# Patient Record
Sex: FEMALE | Race: WHITE | NOT HISPANIC OR LATINO | Employment: OTHER | URBAN - NONMETROPOLITAN AREA
[De-identification: names, ages, dates, MRNs, and addresses within clinical notes are randomized per-mention and may not be internally consistent; named-entity substitution may affect disease eponyms.]

---

## 2021-05-17 ENCOUNTER — OFFICE VISIT (OUTPATIENT)
Dept: CARDIOLOGY | Facility: CLINIC | Age: 51
End: 2021-05-17

## 2021-05-17 VITALS
HEART RATE: 88 BPM | DIASTOLIC BLOOD PRESSURE: 88 MMHG | TEMPERATURE: 97.8 F | HEIGHT: 68 IN | BODY MASS INDEX: 39.07 KG/M2 | WEIGHT: 257.8 LBS | SYSTOLIC BLOOD PRESSURE: 156 MMHG

## 2021-05-17 DIAGNOSIS — R06.02 SHORTNESS OF BREATH: ICD-10-CM

## 2021-05-17 DIAGNOSIS — I25.10 CORONARY ARTERY CALCIFICATION SEEN ON CAT SCAN: ICD-10-CM

## 2021-05-17 DIAGNOSIS — E88.81 METABOLIC SYNDROME: ICD-10-CM

## 2021-05-17 DIAGNOSIS — R53.82 CHRONIC FATIGUE: ICD-10-CM

## 2021-05-17 DIAGNOSIS — I10 ESSENTIAL HYPERTENSION: ICD-10-CM

## 2021-05-17 DIAGNOSIS — E78.00 HYPERCHOLESTEREMIA: ICD-10-CM

## 2021-05-17 DIAGNOSIS — R07.89 CHEST HEAVINESS: Primary | ICD-10-CM

## 2021-05-17 DIAGNOSIS — Z72.0 TOBACCO USE: ICD-10-CM

## 2021-05-17 DIAGNOSIS — K44.9 HIATAL HERNIA: ICD-10-CM

## 2021-05-17 PROBLEM — E88.810 METABOLIC SYNDROME: Status: ACTIVE | Noted: 2021-05-17

## 2021-05-17 PROCEDURE — 93000 ELECTROCARDIOGRAM COMPLETE: CPT | Performed by: INTERNAL MEDICINE

## 2021-05-17 PROCEDURE — 99204 OFFICE O/P NEW MOD 45 MIN: CPT | Performed by: INTERNAL MEDICINE

## 2021-05-17 RX ORDER — ASPIRIN 81 MG/1
81 TABLET ORAL DAILY
Qty: 30 TABLET | Refills: 6 | Status: SHIPPED | OUTPATIENT
Start: 2021-05-17 | End: 2022-10-26 | Stop reason: SDUPTHER

## 2021-05-17 RX ORDER — CALCIUM CARBONATE 200(500)MG
1 TABLET,CHEWABLE ORAL 3 TIMES DAILY PRN
COMMUNITY

## 2021-05-17 RX ORDER — ROSUVASTATIN CALCIUM 10 MG/1
10 TABLET, COATED ORAL DAILY
Qty: 30 TABLET | Refills: 11 | Status: SHIPPED | OUTPATIENT
Start: 2021-05-17 | End: 2022-10-26

## 2021-05-17 RX ORDER — LISINOPRIL AND HYDROCHLOROTHIAZIDE 20; 12.5 MG/1; MG/1
1 TABLET ORAL DAILY
COMMUNITY

## 2021-05-17 NOTE — PROGRESS NOTES
"Chief Complaint   Patient presents with   • Establish Care     Referred per PCP for CAD  . She is now following with [facility] and with  in June 2021   • Chest Pain     Describes chest \"heaviness \" and \"flutters\" . Says these episodes happen frequently.   • Shortness of Breath     reports SOA with minimal exertion  . She does have hiatal hernia and Acid reflux .   • Hypertension     Reports she has not taken antihypertensive this morning.   • LABS     Labs on chart and in referral packet        CARDIAC COMPLAINTS  chest pressure/discomfort, dyspnea and fatigue      Subjective   Monet Espinal is a 50 y.o. female came in today for her initial cardiac evaluation.  She has history of hypertension who has been having problems in the form of chest discomfort, shortness of breath and fatigue.  She also has history of kidney problem and is being followed by nephrologist at [facility].  She also has history of hiatal hernia for which she has seen the gastroenterologist and now seeing a general surgeon also.  She underwent a CT scan of the chest and abdomen.  Found to have a very large hiatal hernia containing the entire gastric fundus and majority of the gastric body.  It was also noted to have significant coronary arterial calcification and also renal vascular calcification.  She also noted to have some changes in the kidney and as stated is followed by the nephrologist at [facility].  She is now referred because she has been having symptoms of chest pain and shortness of breath.  The chest pain is pressure-like feeling in the left part of the chest.  It is different from the heartburn she gets from the hiatal hernia.  It occurs mostly on exertion and occasionally at rest.  She also has been noticing increasing shortness of breath which started occurring even on minimal exertion recently.  She also complain of having extreme fatigue and tired not able to do most of her activities.  She did undergo some " lab work and found to have mild anemia, low iron and low iron saturation.  She also had a low MCV.  Her vitamin D was 25.  Her cholesterol was 207 with the LDL of 135.  Her renal function and liver function appears to be within normal limit.  Her TSH was normal.  She unfortunately smokes about a half a pack a day.  She did quit in January for few months and then restarted again.  She is trying to use vaping.  She drinks occasional alcohol.  Her father  with heart disease.    History reviewed. No pertinent surgical history.    Current Outpatient Medications   Medication Sig Dispense Refill   • calcium carbonate (TUMS) 500 MG chewable tablet Chew 1 tablet 3 (Three) Times a Day As Needed for Indigestion or Heartburn.     • Cyanocobalamin (VITAMIN B-12 IJ) Inject 1 mL as directed Every 30 (Thirty) Days.     • lisinopril-hydrochlorothiazide (PRINZIDE,ZESTORETIC) 20-12.5 MG per tablet Take 1 tablet by mouth Daily.     • aspirin (aspirin) 81 MG EC tablet Take 1 tablet by mouth Daily. 30 tablet 6   • nicotine (NICOTROL) 10 MG inhaler Inhale 1 puff As Needed for Smoking Cessation. 168 each 6   • rosuvastatin (CRESTOR) 10 MG tablet Take 1 tablet by mouth Daily. 30 tablet 11     No current facility-administered medications for this visit.           ALLERGIES:  Patient has no known allergies.    Past Medical History:   Diagnosis Date   • Anemia    • CAD (coronary artery disease)    • GERD (gastroesophageal reflux disease)    • Hiatal hernia    • Hyperlipidemia    • Hypertension    • Renal cyst    • Sleep apnea    • Vitamin D deficiency        Social History     Tobacco Use   Smoking Status Current Every Day Smoker   • Packs/day: 0.50   • Years: 35.00   • Pack years: 17.50   Smokeless Tobacco Never Used          Family History   Problem Relation Age of Onset   • Breast cancer Mother    • Heart disease Father    • Hypertension Father    • Heart attack Father    • Hypertension Brother    • Cancer Maternal Grandmother    • No  "Known Problems Maternal Grandfather    • Diabetes Paternal Grandmother    • Heart attack Paternal Grandmother    • No Known Problems Paternal Grandfather        Review of Systems   Constitutional: Positive for malaise/fatigue. Negative for decreased appetite.   HENT: Negative for congestion and sore throat.    Eyes: Negative for blurred vision.   Cardiovascular: Positive for chest pain and dyspnea on exertion.   Respiratory: Positive for shortness of breath and snoring.    Endocrine: Negative for cold intolerance and heat intolerance.   Hematologic/Lymphatic: Negative for adenopathy. Does not bruise/bleed easily.   Skin: Negative for itching, nail changes and skin cancer.   Musculoskeletal: Negative for arthritis and myalgias.   Gastrointestinal: Negative for abdominal pain, dysphagia and heartburn.   Genitourinary: Negative for bladder incontinence and frequency.   Neurological: Negative for dizziness, light-headedness, seizures and vertigo.   Psychiatric/Behavioral: Negative for altered mental status.   Allergic/Immunologic: Negative for environmental allergies and hives.       Diabetes- No  Thyroid- normal    Objective     /88 (BP Location: Right arm)   Pulse 88   Temp 97.8 °F (36.6 °C)   Ht 172.7 cm (68\")   Wt 117 kg (257 lb 12.8 oz)   BMI 39.20 kg/m²     Vitals and nursing note reviewed.   Constitutional:       Appearance: Healthy appearance. Not in distress.   Eyes:      Conjunctiva/sclera: Conjunctivae normal.      Pupils: Pupils are equal, round, and reactive to light.   HENT:      Head: Normocephalic.   Pulmonary:      Effort: Pulmonary effort is normal.      Breath sounds: Normal breath sounds.   Cardiovascular:      PMI at left midclavicular line. Normal rate. Regular rhythm.      Murmurs: There is a systolic murmur.   Abdominal:      General: Bowel sounds are normal.      Palpations: Abdomen is soft.   Musculoskeletal: Normal range of motion.      Cervical back: Normal range of motion and neck " supple. Skin:     General: Skin is warm and dry.   Neurological:      Mental Status: Alert, oriented to person, place, and time and oriented to person, place and time.           ECG 12 Lead    Date/Time: 5/17/2021 12:49 PM  Performed by: Lupillo Nelson MD  Authorized by: Lupillo Nelson MD   Previous ECG: no previous ECG available  Rhythm: sinus rhythm  Rate: normal  QRS axis: normal  Other findings: non-specific ST-T wave changes    Clinical impression: non-specific ECG              Assessment/Plan   Patient's Body mass index is 39.2 kg/m². indicating that she is obese (BMI >30). Obesity-related health conditions include the following: hypertension, coronary heart disease and dyslipidemias. Obesity is newly identified. BMI is is above average; BMI management plan is completed. We discussed low calorie, low carb based diet program, portion control and increasing exercise..     Diagnoses and all orders for this visit:    1. Chest heaviness (Primary)  -     Stress Test With Myocardial Perfusion One Day; Future  -     aspirin (aspirin) 81 MG EC tablet; Take 1 tablet by mouth Daily.  Dispense: 30 tablet; Refill: 6    2. Essential hypertension    3. Hypercholesteremia  -     Stress Test With Myocardial Perfusion One Day; Future  -     rosuvastatin (CRESTOR) 10 MG tablet; Take 1 tablet by mouth Daily.  Dispense: 30 tablet; Refill: 11    4. Metabolic syndrome    5. Tobacco use  -     nicotine (NICOTROL) 10 MG inhaler; Inhale 1 puff As Needed for Smoking Cessation.  Dispense: 168 each; Refill: 6    6. Hiatal hernia    7. Shortness of breath  -     Adult Transthoracic Echo Complete W/ Cont if Necessary Per Protocol; Future    8. Coronary artery calcification seen on CAT scan  -     aspirin (aspirin) 81 MG EC tablet; Take 1 tablet by mouth Daily.  Dispense: 30 tablet; Refill: 6    9. Chronic fatigue  -     Overnight Sleep Oximetry Study; Future    At baseline her heart rate is stable.  Her blood pressure is  elevated.  She has not taken her medications today.  Her EKG shows normal sinus rhythm with nonspecific ST changes mostly in the inferior leads.  Her clinical examination reveals a BMI of 39.  She does short systolic murmur at the mitral area, slightly diminished peripheral pulse and no pedal edema.    The chest heaviness she describes is worrisome especially with hypertension, coronary calcification by CT scan and hypercholesterolemia along with the smoking.  I scheduled her to undergo a stress test.  Since she is not able to walk much secondary to tendinitis as well as other foot problem, it will be done as a Lexiscan.  I also advised her to be on aspirin at 81 mg once a day.    Regarding her hypertension, it is slightly elevated but she has not taken the medications.  I advised her to start taking it regularly.  If it is still elevated then will consider adding a calcium channel blocker    Regarding her hypercholesterolemia, given her multiple risk factors it needs to be addressed more aggressively.  I started her on Crestor 10 mg once a day and have it rechecked in about 3 to 4 months.  Like to keep the LDL less than 70 if possible    Regarding her metabolic syndrome, had a long talk with her about diet exercise and weight reduction.  I gave her papers on Mediterranean diet    Regarding her tobacco use, I explained to her that using vaping will not be helpful.  I did talk to her about the increased risk of developing more pulmonary problem.  Also talked to her about malignancy, vascular, pulmonary and cardiac problem.  I talked to her about using Nicotrol inhalers and prescription was given.  I also gave her papers to help her quit smoking    Regarding the hiatal hernia, she has been followed by the gastroenterologist.      Regarding the shortness of breath, it could be secondary to her COPD but need to rule out cardiac cause.  I advised her to undergo an echocardiogram to evaluate the LV function, valvular  structures as well as the PA pressure.    Regarding her chronic fatigue, it could be secondary to sleep apnea, advised her to have the nocturnal pulse PO2 measurement done    Based on the results, further recommendations will be made.        Electronically signed by Lupillo Nelson MD May 17, 2021 12:34 EDT

## 2021-05-17 NOTE — PATIENT INSTRUCTIONS
For more information:    Quit Now [place]  1-800-QUIT-NOW  https://[place].quitlogix.org/en-[place]/  Steps to Quit Smoking  Smoking tobacco can be harmful to your health and can affect almost every organ in your body. Smoking puts you, and those around you, at risk for developing many serious chronic diseases. Quitting smoking is difficult, but it is one of the best things that you can do for your health. It is never too late to quit.  What are the benefits of quitting smoking?  When you quit smoking, you lower your risk of developing serious diseases and conditions, such as:  · Lung cancer or lung disease, such as COPD.  · Heart disease.  · Stroke.  · Heart attack.  · Infertility.  · Osteoporosis and bone fractures.  Additionally, symptoms such as coughing, wheezing, and shortness of breath may get better when you quit. You may also find that you get sick less often because your body is stronger at fighting off colds and infections. If you are pregnant, quitting smoking can help to reduce your chances of having a baby of low birth weight.  How do I get ready to quit?  When you decide to quit smoking, create a plan to make sure that you are successful. Before you quit:  · Pick a date to quit. Set a date within the next two weeks to give you time to prepare.  · Write down the reasons why you are quitting. Keep this list in places where you will see it often, such as on your bathroom mirror or in your car or wallet.  · Identify the people, places, things, and activities that make you want to smoke (triggers) and avoid them. Make sure to take these actions:  ¨ Throw away all cigarettes at home, at work, and in your car.  ¨ Throw away smoking accessories, such as ashtrays and lighters.  ¨ Clean your car and make sure to empty the ashtray.  ¨ Clean your home, including curtains and carpets.  · Tell your family, friends, and coworkers that you are quitting. Support from your loved ones can make quitting easier.  · Talk with  your health care provider about your options for quitting smoking.  · Find out what treatment options are covered by your health insurance.  What strategies can I use to quit smoking?  Talk with your healthcare provider about different strategies to quit smoking. Some strategies include:  · Quitting smoking altogether instead of gradually lessening how much you smoke over a period of time. Research shows that quitting “cold turkey” is more successful than gradually quitting.  · Attending in-person counseling to help you build problem-solving skills. You are more likely to have success in quitting if you attend several counseling sessions. Even short sessions of 10 minutes can be effective.  · Finding resources and support systems that can help you to quit smoking and remain smoke-free after you quit. These resources are most helpful when you use them often. They can include:  ¨ Online chats with a counselor.  ¨ Telephone quitlines.  ¨ Printed self-help materials.  ¨ Support groups or group counseling.  ¨ Text messaging programs.  ¨ Mobile phone applications.  · Taking medicines to help you quit smoking. (If you are pregnant or breastfeeding, talk with your health care provider first.) Some medicines contain nicotine and some do not. Both types of medicines help with cravings, but the medicines that include nicotine help to relieve withdrawal symptoms. Your health care provider may recommend:  ¨ Nicotine patches, gum, or lozenges.  ¨ Nicotine inhalers or sprays.  ¨ Non-nicotine medicine that is taken by mouth.  Talk with your health care provider about combining strategies, such as taking medicines while you are also receiving in-person counseling. Using these two strategies together makes you more likely to succeed in quitting than if you used either strategy on its own.  If you are pregnant or breastfeeding, talk with your health care provider about finding counseling or other support strategies to quit smoking. Do  not take medicine to help you quit smoking unless told to do so by your health care provider.  What things can I do to make it easier to quit?  Quitting smoking might feel overwhelming at first, but there is a lot that you can do to make it easier. Take these important actions:  · Reach out to your family and friends and ask that they support and encourage you during this time. Call telephone quitlines, reach out to support groups, or work with a counselor for support.  · Ask people who smoke to avoid smoking around you.  · Avoid places that trigger you to smoke, such as bars, parties, or smoke-break areas at work.  · Spend time around people who do not smoke.  · Lessen stress in your life, because stress can be a smoking trigger for some people. To lessen stress, try:  ¨ Exercising regularly.  ¨ Deep-breathing exercises.  ¨ Yoga.  ¨ Meditating.  ¨ Performing a body scan. This involves closing your eyes, scanning your body from head to toe, and noticing which parts of your body are particularly tense. Purposefully relax the muscles in those areas.  · Download or purchase mobile phone or tablet apps (applications) that can help you stick to your quit plan by providing reminders, tips, and encouragement. There are many free apps, such as QuitGuide from the CDC (Centers for Disease Control and Prevention). You can find other support for quitting smoking (smoking cessation) through smokefree.gov and other websites.  How will I feel when I quit smoking?  Within the first 24 hours of quitting smoking, you may start to feel some withdrawal symptoms. These symptoms are usually most noticeable 2-3 days after quitting, but they usually do not last beyond 2-3 weeks. Changes or symptoms that you might experience include:  · Mood swings.  · Restlessness, anxiety, or irritation.  · Difficulty concentrating.  · Dizziness.  · Strong cravings for sugary foods in addition to nicotine.  · Mild weight  gain.  · Constipation.  · Nausea.  · Coughing or a sore throat.  · Changes in how your medicines work in your body.  · A depressed mood.  · Difficulty sleeping (insomnia).  After the first 2-3 weeks of quitting, you may start to notice more positive results, such as:  · Improved sense of smell and taste.  · Decreased coughing and sore throat.  · Slower heart rate.  · Lower blood pressure.  · Clearer skin.  · The ability to breathe more easily.  · Fewer sick days.  Quitting smoking is very challenging for most people. Do not get discouraged if you are not successful the first time. Some people need to make many attempts to quit before they achieve long-term success. Do your best to stick to your quit plan, and talk with your health care provider if you have any questions or concerns.  This information is not intended to replace advice given to you by your health care provider. Make sure you discuss any questions you have with your health care provider.  Document Released: 12/12/2002 Document Revised: 08/15/2017 Document Reviewed: 05/03/2016  [employer] Patient Education © 2017 [employer]  Mediterranean Diet  A Mediterranean diet refers to food and lifestyle choices that are based on the traditions of countries located on the [place]. This way of eating has been shown to help prevent certain conditions and improve outcomes for people who have chronic diseases, like kidney disease and heart disease.  What are tips for following this plan?  Lifestyle  · Cook and eat meals together with your family, when possible.  · Drink enough fluid to keep your urine clear or pale yellow.  · Be physically active every day. This includes:  ? Aerobic exercise like running or swimming.  ? Leisure activities like gardening, walking, or housework.  · Get 7-8 hours of sleep each night.  · If recommended by your health care provider, drink red wine in moderation. This means 1 glass a day for nonpregnant women and 2  glasses a day for men. A glass of wine equals 5 oz (150 mL).  Reading food labels    · Check the serving size of packaged foods. For foods such as rice and pasta, the serving size refers to the amount of cooked product, not dry.  · Check the total fat in packaged foods. Avoid foods that have saturated fat or trans fats.  · Check the ingredients list for added sugars, such as corn syrup.  Shopping  · At the grocery store, buy most of your food from the areas near the walls of the store. This includes:  ? Fresh fruits and vegetables (produce).  ? Grains, beans, nuts, and seeds. Some of these may be available in unpackaged forms or large amounts (in bulk).  ? Fresh seafood.  ? Poultry and eggs.  ? Low-fat dairy products.  · Buy whole ingredients instead of prepackaged foods.  · Buy fresh fruits and vegetables in-season from local [employer] markets.  · Buy frozen fruits and vegetables in resealable bags.  · If you do not have access to quality fresh seafood, buy precooked frozen shrimp or canned fish, such as tuna, salmon, or sardines.  · Buy small amounts of raw or cooked vegetables, salads, or olives from the deli or salad bar at your store.  · Stock your pantry so you always have certain foods on hand, such as olive oil, canned tuna, canned tomatoes, rice, pasta, and beans.  Cooking  · Cook foods with extra-virgin olive oil instead of using butter or other vegetable oils.  · Have meat as a side dish, and have vegetables or grains as your main dish. This means having meat in small portions or adding small amounts of meat to foods like pasta or stew.  · Use beans or vegetables instead of meat in common dishes like chili or lasagna.  · Diablock with different cooking methods. Try roasting or broiling vegetables instead of steaming or sautéeing them.  · Add frozen vegetables to soups, stews, pasta, or rice.  · Add nuts or seeds for added healthy fat at each meal. You can add these to yogurt, salads, or vegetable  dishes.  · Marinate fish or vegetables using olive oil, lemon juice, garlic, and fresh herbs.  Meal planning    · Plan to eat 1 vegetarian meal one day each week. Try to work up to 2 vegetarian meals, if possible.  · Eat seafood 2 or more times a week.  · Have healthy snacks readily available, such as:  ? Vegetable sticks with hummus.  ? Greek yogurt.  ? Fruit and nut trail mix.  · Eat balanced meals throughout the week. This includes:  ? Fruit: 2-3 servings a day  ? Vegetables: 4-5 servings a day  ? Low-fat dairy: 2 servings a day  ? Fish, poultry, or lean meat: 1 serving a day  ? Beans and legumes: 2 or more servings a week  ? Nuts and seeds: 1-2 servings a day  ? Whole grains: 6-8 servings a day  ? Extra-virgin olive oil: 3-4 servings a day  · Limit red meat and sweets to only a few servings a month  What are my food choices?  · Mediterranean diet  ? Recommended  § Grains: Whole-grain pasta. Brown rice. Bulgar wheat. Polenta. Couscous. Whole-wheat bread. Oatmeal. Quinoa.  § Vegetables: Artichokes. Beets. Broccoli. Cabbage. Carrots. Eggplant. Green beans. Chard. Kale. Spinach. Onions. Leeks. Peas. Squash. Tomatoes. Peppers. Radishes.  § Fruits: Apples. Apricots. Avocado. Berries. Bananas. Cherries. Dates. Figs. Grapes. Michele. Melon. Oranges. Peaches. Plums. Pomegranate.  § Meats and other protein foods: Beans. Almonds. Sunflower seeds. Pine nuts. Peanuts. Cod. [place]. Scallops. Shrimp. Tuna. Tilapia. Clams. Oysters. Eggs.  § Dairy: Low-fat milk. Cheese. Greek yogurt.  § Beverages: Water. Red wine. Herbal tea.  § Fats and oils: Extra virgin olive oil. Avocado oil. Grape seed oil.  § Sweets and desserts: Greek yogurt with honey. Baked apples. Poached pears. Trail mix.  § Seasoning and other foods: Basil. Cilantro. Coriander. Cumin. Mint. Parsley. Ghassan. Rosemary. Tarragon. Garlic. Oregano. Thyme. Pepper. Balsalmic vinegar. Tahini. Hummus. Tomato sauce. Olives. Mushrooms.  ? Limit these  § Grains: Prepackaged pasta  or rice dishes. Prepackaged cereal with added sugar.  § Vegetables: Deep fried potatoes (french fries).  § Fruits: Fruit canned in syrup.  § Meats and other protein foods: Beef. Pork. Lamb. Poultry with skin. Hot dogs. Mcgill.  § Dairy: Ice cream. Sour cream. Whole milk.  § Beverages: Juice. Sugar-sweetened soft drinks. Beer. Liquor and spirits.  § Fats and oils: Butter. Canola oil. Vegetable oil. Beef fat (tallow). Lard.  § Sweets and desserts: Cookies. Cakes. Pies. Candy.  § Seasoning and other foods: Mayonnaise. Premade sauces and marinades.  The items listed may not be a complete list. Talk with your dietitian about what dietary choices are right for you.  Summary  · The Mediterranean diet includes both food and lifestyle choices.  · Eat a variety of fresh fruits and vegetables, beans, nuts, seeds, and whole grains.  · Limit the amount of red meat and sweets that you eat.  · Talk with your health care provider about whether it is safe for you to drink red wine in moderation. This means 1 glass a day for nonpregnant women and 2 glasses a day for men. A glass of wine equals 5 oz (150 mL).  This information is not intended to replace advice given to you by your health care provider. Make sure you discuss any questions you have with your health care provider.  Document Revised: 08/17/2017 Document Reviewed: 08/10/2017  [employer]vier Patient Education © 2020 [employer]

## 2021-05-17 NOTE — PROGRESS NOTES
Monet Espinal  reports that she has been smoking. She has a 17.50 pack-year smoking history. She has never used smokeless tobacco.. I have educated her on the risk of diseases from using tobacco products such as cancer, COPD and heart disease.     I advised her to quit and she is willing to quit. We have discussed the following method/s for tobacco cessation:  Education Material Counseling Prescription Medicaiton.  Together we have set a quit date for 1 week from today.  She will follow up with me in 6 months or sooner to check on her progress.    I spent 3  minutes counseling the patient.

## 2021-06-14 ENCOUNTER — HOSPITAL ENCOUNTER (OUTPATIENT)
Dept: CARDIOLOGY | Facility: HOSPITAL | Age: 51
Discharge: HOME | End: 2021-06-14

## 2021-06-14 ENCOUNTER — HOSPITAL ENCOUNTER (OUTPATIENT)
Dept: CARDIOLOGY | Facility: HOSPITAL | Age: 51
End: 2021-06-14

## 2021-06-14 VITALS — WEIGHT: 257.94 LBS | HEIGHT: 68 IN | BODY MASS INDEX: 39.09 KG/M2

## 2021-06-14 DIAGNOSIS — E78.00 HYPERCHOLESTEREMIA: ICD-10-CM

## 2021-06-14 DIAGNOSIS — R06.02 SHORTNESS OF BREATH: ICD-10-CM

## 2021-06-14 DIAGNOSIS — R07.89 CHEST HEAVINESS: ICD-10-CM

## 2021-06-14 LAB
AORTIC DIMENSIONLESS INDEX: 1 (DI)
BH CV ECHO MEAS - ACS: 1.5 CM
BH CV ECHO MEAS - AO MAX PG (FULL): -1.1 MMHG
BH CV ECHO MEAS - AO MAX PG: 6 MMHG
BH CV ECHO MEAS - AO MEAN PG (FULL): 0 MMHG
BH CV ECHO MEAS - AO MEAN PG: 3 MMHG
BH CV ECHO MEAS - AO ROOT AREA (BSA CORRECTED): 1.5
BH CV ECHO MEAS - AO ROOT AREA: 8.6 CM^2
BH CV ECHO MEAS - AO ROOT DIAM: 3.3 CM
BH CV ECHO MEAS - AO V2 MAX: 122 CM/SEC
BH CV ECHO MEAS - AO V2 MEAN: 81.2 CM/SEC
BH CV ECHO MEAS - AO V2 VTI: 24.4 CM
BH CV ECHO MEAS - BSA(HAYCOCK): 2.4 M^2
BH CV ECHO MEAS - BSA: 2.3 M^2
BH CV ECHO MEAS - BZI_BMI: 39.1 KILOGRAMS/M^2
BH CV ECHO MEAS - BZI_METRIC_HEIGHT: 172.7 CM
BH CV ECHO MEAS - BZI_METRIC_WEIGHT: 116.6 KG
BH CV ECHO MEAS - EDV(CUBED): 106.5 ML
BH CV ECHO MEAS - EDV(TEICH): 104.4 ML
BH CV ECHO MEAS - EF(CUBED): 65.6 %
BH CV ECHO MEAS - EF(TEICH): 57.1 %
BH CV ECHO MEAS - EF_3D-VOL: 59 %
BH CV ECHO MEAS - ESV(CUBED): 36.6 ML
BH CV ECHO MEAS - ESV(TEICH): 44.8 ML
BH CV ECHO MEAS - FS: 30 %
BH CV ECHO MEAS - IVS/LVPW: 0.82
BH CV ECHO MEAS - IVSD: 0.92 CM
BH CV ECHO MEAS - LA 3D VOL INDEX: 27
BH CV ECHO MEAS - LA A2CS (ATRIAL LENGTH): 6.2 CM
BH CV ECHO MEAS - LA DIMENSION: 4.4 CM
BH CV ECHO MEAS - LA/AO: 1.3
BH CV ECHO MEAS - LAT PEAK E' VEL: 8.7 CM/SEC
BH CV ECHO MEAS - LV IVRT: 0.11 SEC
BH CV ECHO MEAS - LV MASS(C)D: 171 GRAMS
BH CV ECHO MEAS - LV MASS(C)DI: 75.2 GRAMS/M^2
BH CV ECHO MEAS - LV MAX PG: 7.1 MMHG
BH CV ECHO MEAS - LV MEAN PG: 3 MMHG
BH CV ECHO MEAS - LV V1 MAX: 133 CM/SEC
BH CV ECHO MEAS - LV V1 MEAN: 71.7 CM/SEC
BH CV ECHO MEAS - LV V1 VTI: 24.6 CM
BH CV ECHO MEAS - LVIDD: 4.7 CM
BH CV ECHO MEAS - LVIDS: 3.3 CM
BH CV ECHO MEAS - LVPWD: 1.1 CM
BH CV ECHO MEAS - MED PEAK E' VEL: 7.9 CM/SEC
BH CV ECHO MEAS - MV A MAX VEL: 61.9 CM/SEC
BH CV ECHO MEAS - MV DEC SLOPE: 429 CM/SEC^2
BH CV ECHO MEAS - MV DEC TIME: 0.25 SEC
BH CV ECHO MEAS - MV E MAX VEL: 81.4 CM/SEC
BH CV ECHO MEAS - MV E/A: 1.3
BH CV ECHO MEAS - MV MAX PG: 3.6 MMHG
BH CV ECHO MEAS - MV MEAN PG: 2 MMHG
BH CV ECHO MEAS - MV P1/2T MAX VEL: 90.8 CM/SEC
BH CV ECHO MEAS - MV P1/2T: 62 MSEC
BH CV ECHO MEAS - MV V2 MAX: 94.7 CM/SEC
BH CV ECHO MEAS - MV V2 MEAN: 60.3 CM/SEC
BH CV ECHO MEAS - MV V2 VTI: 31.7 CM
BH CV ECHO MEAS - MVA P1/2T LCG: 2.4 CM^2
BH CV ECHO MEAS - MVA(P1/2T): 3.5 CM^2
BH CV ECHO MEAS - PA MAX PG (FULL): 1.5 MMHG
BH CV ECHO MEAS - PA MAX PG: 3.2 MMHG
BH CV ECHO MEAS - PA MEAN PG (FULL): 1 MMHG
BH CV ECHO MEAS - PA MEAN PG: 2 MMHG
BH CV ECHO MEAS - PA V2 MAX: 88.9 CM/SEC
BH CV ECHO MEAS - PA V2 MEAN: 66.5 CM/SEC
BH CV ECHO MEAS - PA V2 VTI: 21.5 CM
BH CV ECHO MEAS - RAP SYSTOLE: 10 MMHG
BH CV ECHO MEAS - RV MAX PG: 1.6 MMHG
BH CV ECHO MEAS - RV MEAN PG: 1 MMHG
BH CV ECHO MEAS - RV V1 MAX: 64 CM/SEC
BH CV ECHO MEAS - RV V1 MEAN: 40.6 CM/SEC
BH CV ECHO MEAS - RV V1 VTI: 15.5 CM
BH CV ECHO MEAS - RVDD: 2.7 CM
BH CV ECHO MEAS - RVSP: 15 MMHG
BH CV ECHO MEAS - SI(AO): 91.8 ML/M^2
BH CV ECHO MEAS - SI(CUBED): 30.7 ML/M^2
BH CV ECHO MEAS - SI(TEICH): 26.2 ML/M^2
BH CV ECHO MEAS - SV(AO): 208.7 ML
BH CV ECHO MEAS - SV(CUBED): 69.9 ML
BH CV ECHO MEAS - SV(TEICH): 59.6 ML
BH CV ECHO MEAS - TAPSE (>1.6): 2.4 CM
BH CV ECHO MEAS - TR MAX VEL: 102 CM/SEC
BH CV ECHO MEASUREMENTS AVERAGE E/E' RATIO: 9.81
LEFT ATRIUM VOLUME INDEX: 30 ML/M2
LEFT ATRIUM VOLUME: 69 CM3
MAXIMAL PREDICTED HEART RATE: 170 BPM
STRESS TARGET HR: 145 BPM

## 2021-06-14 PROCEDURE — 93018 CV STRESS TEST I&R ONLY: CPT | Performed by: INTERNAL MEDICINE

## 2021-06-14 PROCEDURE — 78452 HT MUSCLE IMAGE SPECT MULT: CPT | Performed by: INTERNAL MEDICINE

## 2021-06-14 PROCEDURE — 93017 CV STRESS TEST TRACING ONLY: CPT

## 2021-06-14 PROCEDURE — 0 TECHNETIUM SESTAMIBI: Performed by: INTERNAL MEDICINE

## 2021-06-14 PROCEDURE — 93356 MYOCRD STRAIN IMG SPCKL TRCK: CPT | Performed by: INTERNAL MEDICINE

## 2021-06-14 PROCEDURE — 93306 TTE W/DOPPLER COMPLETE: CPT

## 2021-06-14 PROCEDURE — 78452 HT MUSCLE IMAGE SPECT MULT: CPT

## 2021-06-14 PROCEDURE — A9500 TC99M SESTAMIBI: HCPCS | Performed by: INTERNAL MEDICINE

## 2021-06-14 PROCEDURE — 93306 TTE W/DOPPLER COMPLETE: CPT | Performed by: INTERNAL MEDICINE

## 2021-06-14 PROCEDURE — 93356 MYOCRD STRAIN IMG SPCKL TRCK: CPT

## 2021-06-14 RX ADMIN — TECHNETIUM TC 99M SESTAMIBI 1 DOSE: 1 INJECTION INTRAVENOUS at 11:54

## 2021-06-16 ENCOUNTER — TELEPHONE (OUTPATIENT)
Dept: CARDIOLOGY | Facility: CLINIC | Age: 51
End: 2021-06-16

## 2021-06-16 ENCOUNTER — HOSPITAL ENCOUNTER (OUTPATIENT)
Dept: CARDIOLOGY | Facility: HOSPITAL | Age: 51
Discharge: HOME | End: 2021-06-16

## 2021-06-16 LAB
BH CV REST NUCLEAR ISOTOPE DOSE: 10 MCI
BH CV STRESS COMMENTS STAGE 1: NORMAL
BH CV STRESS DOSE REGADENOSON STAGE 1: 0.4
BH CV STRESS DURATION MIN STAGE 1: 0
BH CV STRESS DURATION SEC STAGE 1: 10
BH CV STRESS NUCLEAR ISOTOPE DOSE: 20 MCI
BH CV STRESS PROTOCOL 1: NORMAL
BH CV STRESS RECOVERY BP: NORMAL MMHG
BH CV STRESS RECOVERY HR: 86 BPM
BH CV STRESS STAGE 1: 1
LV EF NUC BP: 45 %
MAXIMAL PREDICTED HEART RATE: 170 BPM
PERCENT MAX PREDICTED HR: 55.88 %
STRESS BASELINE BP: NORMAL MMHG
STRESS BASELINE HR: 79 BPM
STRESS PERCENT HR: 66 %
STRESS POST PEAK BP: NORMAL MMHG
STRESS POST PEAK HR: 95 BPM
STRESS TARGET HR: 145 BPM

## 2021-06-16 PROCEDURE — 25010000002 REGADENOSON 0.4 MG/5ML SOLUTION: Performed by: INTERNAL MEDICINE

## 2021-06-16 PROCEDURE — A9500 TC99M SESTAMIBI: HCPCS | Performed by: INTERNAL MEDICINE

## 2021-06-16 PROCEDURE — 0 TECHNETIUM SESTAMIBI: Performed by: INTERNAL MEDICINE

## 2021-06-16 RX ADMIN — TECHNETIUM TC 99M SESTAMIBI 1 DOSE: 1 INJECTION INTRAVENOUS at 13:47

## 2021-06-16 RX ADMIN — REGADENOSON 0.4 MG: 0.08 INJECTION, SOLUTION INTRAVENOUS at 13:47

## 2021-06-16 NOTE — TELEPHONE ENCOUNTER
Pt made aware of echo results.  Discussed provider recommendations.  Acknowledges.  Verbalizes understanding.

## 2021-06-16 NOTE — TELEPHONE ENCOUNTER
----- Message from Lillie Brantley LPN sent at 6/16/2021 11:12 AM EDT -----    ----- Message -----  From: Lillie Brantley LPN  Sent: 6/16/2021   8:30 AM EDT  To: Lillie Brantley LPN      ----- Message -----  From: Lupillo Nelson MD  Sent: 6/15/2021   6:19 AM EDT  To: Amy Campbell CMA    Normal

## 2021-06-17 RX ORDER — ISOSORBIDE MONONITRATE 30 MG/1
30 TABLET, EXTENDED RELEASE ORAL DAILY
Qty: 30 TABLET | Refills: 6 | Status: SHIPPED | OUTPATIENT
Start: 2021-06-17 | End: 2022-10-26 | Stop reason: SDUPTHER

## 2021-06-17 NOTE — TELEPHONE ENCOUNTER
----- Message from Lupillo Nelson MD sent at 6/17/2021  6:54 AM EDT -----  Imdur.  Cath if more CP

## 2021-06-17 NOTE — TELEPHONE ENCOUNTER
Patient aware of stress test results and recommendations to start Imdur 30 mg daily and if symptoms persist to call our office.  Script pended for approval.

## 2022-05-10 DIAGNOSIS — R07.89 CHEST HEAVINESS: ICD-10-CM

## 2022-05-10 DIAGNOSIS — I25.10 CORONARY ARTERY CALCIFICATION SEEN ON CAT SCAN: ICD-10-CM

## 2022-05-10 RX ORDER — ASPIRIN 81 MG/1
TABLET ORAL
Qty: 30 TABLET | Refills: 6 | OUTPATIENT
Start: 2022-05-10

## 2022-07-09 DIAGNOSIS — R07.89 CHEST HEAVINESS: ICD-10-CM

## 2022-07-09 DIAGNOSIS — E78.00 HYPERCHOLESTEREMIA: ICD-10-CM

## 2022-07-09 DIAGNOSIS — I25.10 CORONARY ARTERY CALCIFICATION SEEN ON CAT SCAN: ICD-10-CM

## 2022-07-21 RX ORDER — ROSUVASTATIN CALCIUM 10 MG/1
TABLET, COATED ORAL
Qty: 30 TABLET | Refills: 11 | OUTPATIENT
Start: 2022-07-21

## 2022-07-21 RX ORDER — ISOSORBIDE MONONITRATE 30 MG/1
TABLET, EXTENDED RELEASE ORAL
Qty: 30 TABLET | Refills: 6 | OUTPATIENT
Start: 2022-07-21

## 2022-07-21 RX ORDER — ASPIRIN 81 MG/1
TABLET ORAL
Qty: 30 TABLET | Refills: 6 | OUTPATIENT
Start: 2022-07-21

## 2022-10-26 ENCOUNTER — OFFICE VISIT (OUTPATIENT)
Dept: CARDIOLOGY | Facility: CLINIC | Age: 52
End: 2022-10-26

## 2022-10-26 VITALS
DIASTOLIC BLOOD PRESSURE: 80 MMHG | WEIGHT: 259.4 LBS | HEIGHT: 68 IN | BODY MASS INDEX: 39.31 KG/M2 | SYSTOLIC BLOOD PRESSURE: 120 MMHG | HEART RATE: 80 BPM

## 2022-10-26 DIAGNOSIS — E78.00 HYPERCHOLESTEREMIA: ICD-10-CM

## 2022-10-26 DIAGNOSIS — R07.89 CHEST HEAVINESS: ICD-10-CM

## 2022-10-26 DIAGNOSIS — R06.02 SHORTNESS OF BREATH: ICD-10-CM

## 2022-10-26 DIAGNOSIS — I10 ESSENTIAL HYPERTENSION: Primary | ICD-10-CM

## 2022-10-26 DIAGNOSIS — I25.10 CORONARY ARTERY CALCIFICATION SEEN ON CAT SCAN: ICD-10-CM

## 2022-10-26 DIAGNOSIS — Z72.0 TOBACCO USE: ICD-10-CM

## 2022-10-26 PROCEDURE — 99214 OFFICE O/P EST MOD 30 MIN: CPT | Performed by: NURSE PRACTITIONER

## 2022-10-26 RX ORDER — ASPIRIN 81 MG/1
81 TABLET ORAL DAILY
Qty: 90 TABLET | Refills: 3 | Status: SHIPPED | OUTPATIENT
Start: 2022-10-26

## 2022-10-26 RX ORDER — ISOSORBIDE MONONITRATE 30 MG/1
30 TABLET, EXTENDED RELEASE ORAL DAILY
Qty: 90 TABLET | Refills: 3 | Status: SHIPPED | OUTPATIENT
Start: 2022-10-26

## 2022-10-26 RX ORDER — NICOTINE 21 MG/24HR
1 PATCH, TRANSDERMAL 24 HOURS TRANSDERMAL EVERY 24 HOURS
Qty: 30 PATCH | Refills: 3 | Status: SHIPPED | OUTPATIENT
Start: 2022-10-26

## 2022-10-26 RX ORDER — BUPROPION HYDROCHLORIDE 150 MG/1
150 TABLET ORAL DAILY
COMMUNITY

## 2022-10-26 RX ORDER — UBIDECARENONE 50 MG
50 CAPSULE ORAL DAILY
Qty: 180 CAPSULE | Refills: 3 | Status: SHIPPED | OUTPATIENT
Start: 2022-10-26

## 2022-10-26 RX ORDER — ATORVASTATIN CALCIUM 20 MG/1
20 TABLET, FILM COATED ORAL DAILY
COMMUNITY

## 2022-10-26 RX ORDER — OMEPRAZOLE 20 MG/1
20 CAPSULE, DELAYED RELEASE ORAL DAILY
Qty: 90 CAPSULE | Refills: 1 | Status: SHIPPED | OUTPATIENT
Start: 2022-10-26

## 2022-10-26 NOTE — PROGRESS NOTES
Chief Complaint   Patient presents with   • Follow-up     Cardiac management   • Lab     Last labs a month ago per Dr Bhandari. Receiving IV Iron. Last labs 5-6 weeks ago per PCP.   • Chest Pain     Has had some heaviness in chest. She has been out of the Imdur, has been out of time. She has changed living arrangements.   • Shortness of Breath     Having with minimal exertion. Wears O2 at 2 L/M [facility].    • Med Refill     Needs refills on Imdur, aspirin, and nicotine inhaler  -90 day     Subjective       Monet Espinal is a 51 y.o. female with HTN referred for cardiac evaluation of chest discomfort, SOB and fatigue in May 2021.  Prior to consult, CT chest and abdomen secondary to hiatal hernia showed very large hiatal hernia and coronary artery calcification as well as renovascular calcification.  Thus cardiac consult requested.  Lexiscan stress showed anteroseptal wall ischemia versus breast attenuation.  EF of 45%.  EF 65% by echo.  Imdur 30 mg daily with plan for cath if symptoms persisted. Crestor 10 mg started for . LA dilated at 4.4 cm.  Overnight pulse ox ordered but not resulted.  Labs also showed FLORENTINO.  Low-dose aspirin added.  Nicotrol inhaler given for smoking cessation.  We did not see her after this.    She presents today for follow-up.  She has been out of Imdur for for few months.  She admits to significant improvement in chest heaviness while taking Imdur and has had recurrence of symptoms after running out.  She was able to quit smoking for a while but resumed due to stressors.  She is recently ended a stressful relationship and changed her living arrangements.  She has shortness of breath with exertion.  She is receiving iron infusions with Dr. Bhandari.  Wears O2 at night.       Cardiac History:    Past Surgical History:   Procedure Laterality Date   • CARDIOVASCULAR STRESS TEST  06/16/2021    Lexiscan- EF 45%. R/O Anteroseptal Ischemia..   • ECHO - CONVERTED  06/14/2021    TLS. EF 65%. Mild  - 4.4 Cm RVSP- 15 mmHg   • OTHER SURGICAL HISTORY  03/10/2021    CT Chest & Abdomen- Coronary Calcification, Renal vascular Calcification.     Current Outpatient Medications   Medication Sig Dispense Refill   • aspirin 81 MG EC tablet Take 1 tablet by mouth Daily. 90 tablet 3   • atorvastatin (LIPITOR) 20 MG tablet Take 1 tablet by mouth Daily.     • buPROPion XL (WELLBUTRIN XL) 150 MG 24 hr tablet Take 1 tablet by mouth Daily.     • calcium carbonate (TUMS) 500 MG chewable tablet Chew 1 tablet 3 (Three) Times a Day As Needed for Indigestion or Heartburn.     • isosorbide mononitrate (IMDUR) 30 MG 24 hr tablet Take 1 tablet by mouth Daily. 90 tablet 3   • lisinopril-hydrochlorothiazide (PRINZIDE,ZESTORETIC) 20-12.5 MG per tablet Take 1 tablet by mouth Daily.     • Coenzyme Q10 (CoQ10) 50 MG capsule Take 1 capsule by mouth Daily. 180 capsule 3   • nicotine (NICODERM CQ) 21 MG/24HR patch Place 1 patch on the skin as directed by provider Daily. 30 patch 3   • omeprazole (priLOSEC) 20 MG capsule Take 1 capsule by mouth Daily. 90 capsule 1     No current facility-administered medications for this visit.     Patient has no known allergies.    Past Medical History:   Diagnosis Date   • Anemia    • CAD (coronary artery disease)    • GERD (gastroesophageal reflux disease)    • Hiatal hernia    • HS (hereditary spherocytosis) (HCC)    • Hyperlipidemia    • Hypertension    • Renal cyst    • Sleep apnea    • Vitamin D deficiency      Social History     Socioeconomic History   • Marital status: Legally    Tobacco Use   • Smoking status: Every Day     Packs/day: 1.00     Years: 35.00     Pack years: 35.00     Types: Cigarettes   • Smokeless tobacco: Never   Vaping Use   • Vaping Use: Former   • Quit date: 4/1/2021   • Substances: Nicotine   • Devices: Refillable tank   Substance and Sexual Activity   • Alcohol use: Not Currently     Comment: Occasional drink 1 time yearly   • Drug use: Never   • Sexual activity:  "Defer     Family History   Problem Relation Age of Onset   • Breast cancer Mother    • Heart disease Father    • Hypertension Father    • Heart attack Father    • Hypertension Brother    • Cancer Maternal Grandmother    • No Known Problems Maternal Grandfather    • Diabetes Paternal Grandmother    • Heart attack Paternal Grandmother    • No Known Problems Paternal Grandfather      Review of Systems   Constitutional: Positive for malaise/fatigue and weight gain. Negative for decreased appetite.   HENT: Negative.    Eyes: Negative for blurred vision.   Cardiovascular: Positive for chest pain and dyspnea on exertion. Negative for leg swelling, palpitations and syncope.   Respiratory: Positive for shortness of breath and sleep disturbances due to breathing.    Endocrine: Negative.    Hematologic/Lymphatic: Negative for bleeding problem. Does not bruise/bleed easily.   Skin: Negative.    Musculoskeletal: Negative for falls and myalgias.   Gastrointestinal: Negative for abdominal pain, heartburn and melena.   Genitourinary: Negative for hematuria.   Neurological: Negative for dizziness.   Psychiatric/Behavioral: Negative for altered mental status.   Allergic/Immunologic: Negative.       Objective     /80 (BP Location: Left arm)   Pulse 80   Ht 172.7 cm (67.99\")   Wt 118 kg (259 lb 6.4 oz)   BMI 39.45 kg/m²     Vitals and nursing note reviewed.   Constitutional:       General: Not in acute distress.     Appearance: Well-developed. Not diaphoretic.   Eyes:      Pupils: Pupils are equal, round, and reactive to light.   HENT:      Head: Normocephalic.   Pulmonary:      Effort: Pulmonary effort is normal. No respiratory distress.      Breath sounds: Examination of the right-lower field reveals decreased breath sounds. Examination of the left-lower field reveals decreased breath sounds. Decreased breath sounds present.   Cardiovascular:      Normal rate. Regular rhythm.      Murmurs: There is a systolic murmur. "   Pulses:     Intact distal pulses.   Abdominal:      General: Bowel sounds are normal.      Palpations: Abdomen is soft.   Musculoskeletal: Normal range of motion.      Cervical back: Normal range of motion. Skin:     General: Skin is warm and dry.   Neurological:      Mental Status: Alert and oriented to person, place, and time.        Procedures          Problem List Items Addressed This Visit        Cardiac and Vasculature    Coronary artery calcification seen on CAT scan    Relevant Medications    isosorbide mononitrate (IMDUR) 30 MG 24 hr tablet    aspirin 81 MG EC tablet    Hypercholesteremia    Relevant Medications    atorvastatin (LIPITOR) 20 MG tablet    Essential hypertension - Primary    Chest heaviness    Relevant Medications    aspirin 81 MG EC tablet       Pulmonary and Pneumonias    Shortness of breath       Tobacco    Tobacco use      1.  Abnormal stress test- nuclear images show questionable anteroseptal ischemia versus breast attenuation.  We reviewed these findings in detail.  She responded well to Imdur but is out of the medication.  Advised to restart isosorbide 30 mg once daily.  If chest heaviness persist, will consider cardiac cath.  Continue aspirin, statin.    2.  Hypertension-well-controlled 120/80 with lisinopril hydrochlorothiazide.  Limit sodium.  Weight loss.    3.  Hypercholesterolemia-managed with atorvastatin 20 mg.  Tolerates well but does admit to leg cramps at night.  Add co-Q10. Adequate fluid intake.  Labs followed by PCP and Dr. Bhandari.    4.  Tobacco use-she expresses interest in smoking cessation, prescribed NicoDerm CQ 21 mg patch with instructions for use.      Class 2 Severe Obesity (BMI >=35 and <=39.9). Obesity-related health conditions include the following: obstructive sleep apnea, hypertension, coronary heart disease, diabetes mellitus and dyslipidemias. Obesity is unchanged. BMI is is above average; BMI management plan is completed. We discussed portion control and  increasing exercise.     Monet Espinal  reports that she has been smoking cigarettes. She has a 35.00 pack-year smoking history. She has never used smokeless tobacco.. I have educated her on the risk of diseases from using tobacco products such as cancer, COPD and heart disease.     I advised her to quit and she is willing to quit. We have discussed the following method/s for tobacco cessation:  Prescription Medicaiton.  Nicotine patch prescribed. I spent 3.5 minutes counseling the patient.            Electronically signed by SUNITA Menchaca,  October 28, 2022 09:51 EDT

## 2023-04-27 RX ORDER — OMEPRAZOLE 20 MG/1
CAPSULE, DELAYED RELEASE ORAL
Qty: 90 CAPSULE | Refills: 1 | Status: SHIPPED | OUTPATIENT
Start: 2023-04-27

## 2023-05-02 ENCOUNTER — OFFICE VISIT (OUTPATIENT)
Dept: CARDIOLOGY | Facility: CLINIC | Age: 53
End: 2023-05-02
Payer: COMMERCIAL

## 2023-05-02 VITALS
SYSTOLIC BLOOD PRESSURE: 130 MMHG | BODY MASS INDEX: 39.77 KG/M2 | HEIGHT: 68 IN | HEART RATE: 80 BPM | WEIGHT: 262.4 LBS | DIASTOLIC BLOOD PRESSURE: 90 MMHG

## 2023-05-02 DIAGNOSIS — I25.10 CORONARY ARTERY CALCIFICATION SEEN ON CAT SCAN: ICD-10-CM

## 2023-05-02 DIAGNOSIS — R07.89 CHEST HEAVINESS: ICD-10-CM

## 2023-05-02 DIAGNOSIS — E78.00 HYPERCHOLESTEREMIA: ICD-10-CM

## 2023-05-02 DIAGNOSIS — I10 ESSENTIAL HYPERTENSION: Primary | ICD-10-CM

## 2023-05-02 DIAGNOSIS — R06.02 SHORTNESS OF BREATH: ICD-10-CM

## 2023-05-02 PROCEDURE — 99214 OFFICE O/P EST MOD 30 MIN: CPT | Performed by: NURSE PRACTITIONER

## 2023-05-02 PROCEDURE — 3075F SYST BP GE 130 - 139MM HG: CPT | Performed by: NURSE PRACTITIONER

## 2023-05-02 PROCEDURE — 3080F DIAST BP >= 90 MM HG: CPT | Performed by: NURSE PRACTITIONER

## 2023-05-02 PROCEDURE — 1159F MED LIST DOCD IN RCRD: CPT | Performed by: NURSE PRACTITIONER

## 2023-05-02 PROCEDURE — 1160F RVW MEDS BY RX/DR IN RCRD: CPT | Performed by: NURSE PRACTITIONER

## 2023-05-02 RX ORDER — LISINOPRIL AND HYDROCHLOROTHIAZIDE 20; 12.5 MG/1; MG/1
1 TABLET ORAL DAILY
Qty: 90 TABLET | Refills: 3 | Status: SHIPPED | OUTPATIENT
Start: 2023-05-02

## 2023-05-02 RX ORDER — ATORVASTATIN CALCIUM 20 MG/1
20 TABLET, FILM COATED ORAL DAILY
Qty: 90 TABLET | Refills: 3 | Status: SHIPPED | OUTPATIENT
Start: 2023-05-02

## 2023-05-02 RX ORDER — ASPIRIN 81 MG/1
81 TABLET ORAL DAILY
Qty: 90 TABLET | Refills: 3 | Status: SHIPPED | OUTPATIENT
Start: 2023-05-02

## 2023-05-02 RX ORDER — UBIDECARENONE 50 MG
CAPSULE ORAL
Qty: 180 CAPSULE | Refills: 3 | Status: SHIPPED | OUTPATIENT
Start: 2023-05-02

## 2023-05-02 RX ORDER — OMEPRAZOLE 20 MG/1
20 CAPSULE, DELAYED RELEASE ORAL DAILY
Qty: 90 CAPSULE | Refills: 3 | Status: SHIPPED | OUTPATIENT
Start: 2023-05-02

## 2023-05-02 RX ORDER — ISOSORBIDE MONONITRATE 30 MG/1
30 TABLET, EXTENDED RELEASE ORAL DAILY
Qty: 90 TABLET | Refills: 3 | Status: SHIPPED | OUTPATIENT
Start: 2023-05-02

## 2023-05-02 NOTE — PROGRESS NOTES
Chief Complaint   Patient presents with   • Follow-up     Cardiac management   • Lab     Last labs in January per Dr Bhandari. To also have labs 5/22/23 per Dr Bhandari.   • Med Refill     Needs refills on cardiac medications-90 day.   • Smoking     She stopped smoking 11/1/22.     Wilfredo Espinal is a 52 y.o. female with HTN referred for cardiac evaluation of chest discomfort, SOB and fatigue in May 2021.  Prior to consult, CT chest and abdomen showed very large hiatal hernia and coronary artery calcification as well as renovascular calcification. Lexiscan showed anteroseptal wall ischemia versus breast attenuation. Imdur 30 mg daily with plan for cath if symptoms persisted. Crestor 10 mg started for , later changed to Lipitor.     She returns today for follow-up.  She denies cardiac symptoms.  Doing well with Imdur.  No chest tightness.  She quit smoking in November 2022!  She continues to see Dr. Bhandari for FLORENTINO, her last iron infusion was in January.  Plans to have labs later this month.  No recent lipid panel that she knows of.         Cardiac History:    Past Surgical History:   Procedure Laterality Date   • CARDIOVASCULAR STRESS TEST  06/16/2021    Lexiscan- EF 45%. R/O Anteroseptal Ischemia..   • ECHO - CONVERTED  06/14/2021    TLS. EF 65%. Mild MR.LA- 4.4 Cm RVSP- 15 mmHg   • OTHER SURGICAL HISTORY  03/10/2021    CT Chest & Abdomen- Coronary Calcification, Renal vascular Calcification.     Current Outpatient Medications   Medication Sig Dispense Refill   • aspirin 81 MG EC tablet Take 1 tablet by mouth Daily. 90 tablet 3   • atorvastatin (LIPITOR) 20 MG tablet Take 1 tablet by mouth Daily. 90 tablet 3   • buPROPion XL (WELLBUTRIN XL) 150 MG 24 hr tablet Take 1 tablet by mouth Daily.     • calcium carbonate (TUMS) 500 MG chewable tablet Chew 1 tablet 3 (Three) Times a Day As Needed for Indigestion or Heartburn.     • Coenzyme Q10 (CoQ10) 50 MG capsule Take 1-2 capsules daily 180 capsule 3    • isosorbide mononitrate (IMDUR) 30 MG 24 hr tablet Take 1 tablet by mouth Daily. 90 tablet 3   • lisinopril-hydrochlorothiazide (PRINZIDE,ZESTORETIC) 20-12.5 MG per tablet Take 1 tablet by mouth Daily. 90 tablet 3   • omeprazole (priLOSEC) 20 MG capsule Take 1 capsule by mouth Daily. 90 capsule 3     No current facility-administered medications for this visit.     Patient has no known allergies.    Past Medical History:   Diagnosis Date   • Anemia    • CAD (coronary artery disease)    • GERD (gastroesophageal reflux disease)    • Hiatal hernia    • HS (hereditary spherocytosis)    • Hyperlipidemia    • Hypertension    • Renal cyst    • Sleep apnea    • Vitamin D deficiency      Social History     Socioeconomic History   • Marital status: Legally    Tobacco Use   • Smoking status: Former     Packs/day: 1.00     Years: 35.00     Pack years: 35.00     Types: Cigarettes     Quit date: 2022     Years since quittin.5   • Smokeless tobacco: Never   Vaping Use   • Vaping Use: Former   • Quit date: 2021   • Substances: Nicotine   • Devices: FFFavs   Substance and Sexual Activity   • Alcohol use: Not Currently     Comment: Occasional drink 1 time yearly   • Drug use: Never   • Sexual activity: Defer     Family History   Problem Relation Age of Onset   • Breast cancer Mother    • Heart disease Father    • Hypertension Father    • Heart attack Father    • Heart disease Brother         valve replacement   • Hypertension Brother    • Cancer Maternal Grandmother    • No Known Problems Maternal Grandfather    • Diabetes Paternal Grandmother    • Heart attack Paternal Grandmother    • No Known Problems Paternal Grandfather      Review of Systems   Constitutional: Positive for weight gain (+3). Negative for decreased appetite and malaise/fatigue.   HENT: Negative.    Eyes: Negative for blurred vision.   Cardiovascular: Negative for chest pain, dyspnea on exertion, leg swelling, palpitations and  "syncope.   Respiratory: Negative for shortness of breath and sleep disturbances due to breathing.    Endocrine: Negative.    Hematologic/Lymphatic: Negative for bleeding problem. Does not bruise/bleed easily.   Skin: Negative.    Musculoskeletal: Negative for falls and myalgias.   Gastrointestinal: Negative for abdominal pain, heartburn and melena.   Genitourinary: Negative for hematuria.   Neurological: Negative for dizziness and light-headedness.   Psychiatric/Behavioral: Negative for altered mental status.   Allergic/Immunologic: Negative.       Objective     /90 (BP Location: Left arm) Comment: forgot to take medication today  Pulse 80   Ht 172.7 cm (67.99\")   Wt 119 kg (262 lb 6.4 oz)   BMI 39.91 kg/m²     Vitals and nursing note reviewed.   Constitutional:       General: Not in acute distress.     Appearance: Well-developed. Not diaphoretic.   Eyes:      Pupils: Pupils are equal, round, and reactive to light.   HENT:      Head: Normocephalic.   Pulmonary:      Effort: Pulmonary effort is normal. No respiratory distress.      Breath sounds: Normal breath sounds.   Cardiovascular:      Normal rate. Regular rhythm.   Pulses:     Intact distal pulses.   Abdominal:      General: Bowel sounds are normal.      Palpations: Abdomen is soft.   Musculoskeletal: Normal range of motion.      Cervical back: Normal range of motion. Skin:     General: Skin is warm and dry.   Neurological:      Mental Status: Alert and oriented to person, place, and time.          Procedures          Problem List Items Addressed This Visit        Cardiac and Vasculature    Coronary artery calcification seen on CAT scan    Relevant Medications    isosorbide mononitrate (IMDUR) 30 MG 24 hr tablet    aspirin 81 MG EC tablet    Other Relevant Orders    CBC (No Diff)    Comprehensive Metabolic Panel    Lipid Panel    TSH    Hypercholesteremia    Relevant Medications    atorvastatin (LIPITOR) 20 MG tablet    Other Relevant Orders    CBC " (No Diff)    Comprehensive Metabolic Panel    Lipid Panel    TSH    Essential hypertension - Primary    Relevant Medications    lisinopril-hydrochlorothiazide (PRINZIDE,ZESTORETIC) 20-12.5 MG per tablet    Other Relevant Orders    CBC (No Diff)    Comprehensive Metabolic Panel    Lipid Panel    TSH    Chest heaviness    Relevant Medications    aspirin 81 MG EC tablet    Other Relevant Orders    CBC (No Diff)    Comprehensive Metabolic Panel    Lipid Panel    TSH       Pulmonary and Pneumonias    Shortness of breath    Relevant Orders    CBC (No Diff)    Comprehensive Metabolic Panel    TSH      1.  Abnormal stress test-questionable anteroseptal ischemia versus breast attenuation, continue to manage medically with Imdur 30 mg.  We will proceed with cardiac cath should she develop new or worsening symptoms.    2.  HTN-well-controlled with lisinopril/HCTZ.  Continue same plan.    3.  Dyslipidemia-managed with atorvastatin, resume coq.10 for statin induced myalgia.  Labs ordered to recheck lipids and LFT.    4.  Tobacco use-now in remission.    5.  FLORENTINO-follows with hematology with stable CBC.  Planning to see Dr. Bhandari later this month.    No changes made today.  Refill sent.  Follow-up in 6 months or sooner if needed.    Class 2 Severe Obesity (BMI >=35 and <=39.9). Obesity-related health conditions include the following: obstructive sleep apnea, hypertension, coronary heart disease, diabetes mellitus, dyslipidemias and GERD. Obesity is unchanged. BMI is is above average; BMI management plan is completed. We discussed portion control and increasing exercise.     She has maintained smoking cessation for 6 months now!                 Electronically signed by SUNITA Menchaca,  May 3, 2023 09:33 EDT

## 2023-05-02 NOTE — LETTER
May 3, 2023       No Recipients    Patient: Monet Espinal   YOB: 1970   Date of Visit: 5/2/2023       Dear SUNITA Salvador    Monet Espinal was in my office today. Below is a copy of my note.    If you have questions, please do not hesitate to call me. I look forward to following Monet along with you.         Sincerely,        SUNITA Menchaca        CC:   No Recipients    Chief Complaint   Patient presents with   • Follow-up     Cardiac management   • Lab     Last labs in January per Dr Bhandari. To also have labs 5/22/23 per Dr Bhandari.   • Med Refill     Needs refills on cardiac medications-90 day.   • Smoking     She stopped smoking 11/1/22.     Subjective       Monet Espinal is a 52 y.o. female with HTN referred for cardiac evaluation of chest discomfort, SOB and fatigue in May 2021.  Prior to consult, CT chest and abdomen showed very large hiatal hernia and coronary artery calcification as well as renovascular calcification. Lexiscan showed anteroseptal wall ischemia versus breast attenuation. Imdur 30 mg daily with plan for cath if symptoms persisted. Crestor 10 mg started for , later changed to Lipitor.     She returns today for follow-up.  She denies cardiac symptoms.  Doing well with Imdur.  No chest tightness.  She quit smoking in November 2022!  She continues to see Dr. Bhandari for FLORENTINO, her last iron infusion was in January.  Plans to have labs later this month.  No recent lipid panel that she knows of.        Cardiac History:    Past Surgical History:   Procedure Laterality Date   • CARDIOVASCULAR STRESS TEST  06/16/2021    Lexiscan- EF 45%. R/O Anteroseptal Ischemia..   • ECHO - CONVERTED  06/14/2021    TLS. EF 65%. Mild MR.LA- 4.4 Cm RVSP- 15 mmHg   • OTHER SURGICAL HISTORY  03/10/2021    CT Chest & Abdomen- Coronary Calcification, Renal vascular Calcification.     Current Outpatient Medications   Medication Sig Dispense Refill   • aspirin 81 MG EC tablet Take 1  tablet by mouth Daily. 90 tablet 3   • atorvastatin (LIPITOR) 20 MG tablet Take 1 tablet by mouth Daily. 90 tablet 3   • buPROPion XL (WELLBUTRIN XL) 150 MG 24 hr tablet Take 1 tablet by mouth Daily.     • calcium carbonate (TUMS) 500 MG chewable tablet Chew 1 tablet 3 (Three) Times a Day As Needed for Indigestion or Heartburn.     • Coenzyme Q10 (CoQ10) 50 MG capsule Take 1-2 capsules daily 180 capsule 3   • isosorbide mononitrate (IMDUR) 30 MG 24 hr tablet Take 1 tablet by mouth Daily. 90 tablet 3   • lisinopril-hydrochlorothiazide (PRINZIDE,ZESTORETIC) 20-12.5 MG per tablet Take 1 tablet by mouth Daily. 90 tablet 3   • omeprazole (priLOSEC) 20 MG capsule Take 1 capsule by mouth Daily. 90 capsule 3     No current facility-administered medications for this visit.     Patient has no known allergies.    Past Medical History:   Diagnosis Date   • Anemia    • CAD (coronary artery disease)    • GERD (gastroesophageal reflux disease)    • Hiatal hernia    • HS (hereditary spherocytosis)    • Hyperlipidemia    • Hypertension    • Renal cyst    • Sleep apnea    • Vitamin D deficiency      Social History     Socioeconomic History   • Marital status: Legally    Tobacco Use   • Smoking status: Former     Packs/day: 1.00     Years: 35.00     Pack years: 35.00     Types: Cigarettes     Quit date: 2022     Years since quittin.5   • Smokeless tobacco: Never   Vaping Use   • Vaping Use: Former   • Quit date: 2021   • Substances: Nicotine   • Devices: Refillable tank   Substance and Sexual Activity   • Alcohol use: Not Currently     Comment: Occasional drink 1 time yearly   • Drug use: Never   • Sexual activity: Defer     Family History   Problem Relation Age of Onset   • Breast cancer Mother    • Heart disease Father    • Hypertension Father    • Heart attack Father    • Heart disease Brother         valve replacement   • Hypertension Brother    • Cancer Maternal Grandmother    • No Known Problems Maternal  "Grandfather    • Diabetes Paternal Grandmother    • Heart attack Paternal Grandmother    • No Known Problems Paternal Grandfather      Review of Systems   Constitutional: Positive for weight gain (+3). Negative for decreased appetite and malaise/fatigue.   HENT: Negative.    Eyes: Negative for blurred vision.   Cardiovascular: Negative for chest pain, dyspnea on exertion, leg swelling, palpitations and syncope.   Respiratory: Negative for shortness of breath and sleep disturbances due to breathing.    Endocrine: Negative.    Hematologic/Lymphatic: Negative for bleeding problem. Does not bruise/bleed easily.   Skin: Negative.    Musculoskeletal: Negative for falls and myalgias.   Gastrointestinal: Negative for abdominal pain, heartburn and melena.   Genitourinary: Negative for hematuria.   Neurological: Negative for dizziness and light-headedness.   Psychiatric/Behavioral: Negative for altered mental status.   Allergic/Immunologic: Negative.       Objective      /90 (BP Location: Left arm) Comment: forgot to take medication today  Pulse 80   Ht 172.7 cm (67.99\")   Wt 119 kg (262 lb 6.4 oz)   BMI 39.91 kg/m²     Vitals and nursing note reviewed.   Constitutional:       General: Not in acute distress.     Appearance: Well-developed. Not diaphoretic.   Eyes:      Pupils: Pupils are equal, round, and reactive to light.   HENT:      Head: Normocephalic.   Pulmonary:      Effort: Pulmonary effort is normal. No respiratory distress.      Breath sounds: Normal breath sounds.   Cardiovascular:      Normal rate. Regular rhythm.   Pulses:     Intact distal pulses.   Abdominal:      General: Bowel sounds are normal.      Palpations: Abdomen is soft.   Musculoskeletal: Normal range of motion.      Cervical back: Normal range of motion. Skin:     General: Skin is warm and dry.   Neurological:      Mental Status: Alert and oriented to person, place, and time.          Procedures         Problem List Items Addressed This " Visit          Cardiac and Vasculature    Coronary artery calcification seen on CAT scan    Relevant Medications    isosorbide mononitrate (IMDUR) 30 MG 24 hr tablet    aspirin 81 MG EC tablet    Other Relevant Orders    CBC (No Diff)    Comprehensive Metabolic Panel    Lipid Panel    TSH    Hypercholesteremia    Relevant Medications    atorvastatin (LIPITOR) 20 MG tablet    Other Relevant Orders    CBC (No Diff)    Comprehensive Metabolic Panel    Lipid Panel    TSH    Essential hypertension - Primary    Relevant Medications    lisinopril-hydrochlorothiazide (PRINZIDE,ZESTORETIC) 20-12.5 MG per tablet    Other Relevant Orders    CBC (No Diff)    Comprehensive Metabolic Panel    Lipid Panel    TSH    Chest heaviness    Relevant Medications    aspirin 81 MG EC tablet    Other Relevant Orders    CBC (No Diff)    Comprehensive Metabolic Panel    Lipid Panel    TSH       Pulmonary and Pneumonias    Shortness of breath    Relevant Orders    CBC (No Diff)    Comprehensive Metabolic Panel    TSH      1.  Abnormal stress test-questionable anteroseptal ischemia versus breast attenuation, continue to manage medically with Imdur 30 mg.  We will proceed with cardiac cath should she develop new or worsening symptoms.    2.  HTN-well-controlled with lisinopril/HCTZ.  Continue same plan.    3.  Dyslipidemia-managed with atorvastatin, resume coq.10 for statin induced myalgia.  Labs ordered to recheck lipids and LFT.    4.  Tobacco use-now in remission.    5.  FLORENTINO-follows with hematology with stable CBC.  Planning to see Dr. Bhandari later this month.    No changes made today.  Refill sent.  Follow-up in 6 months or sooner if needed.    Class 2 Severe Obesity (BMI >=35 and <=39.9). Obesity-related health conditions include the following: obstructive sleep apnea, hypertension, coronary heart disease, diabetes mellitus, dyslipidemias and GERD. Obesity is unchanged. BMI is is above average; BMI management plan is completed. We discussed  portion control and increasing exercise.     She has maintained smoking cessation for 6 months now!                Electronically signed by SUNITA Menchaca,  May 3, 2023 09:33 EDT

## 2023-08-16 ENCOUNTER — TELEPHONE (OUTPATIENT)
Dept: CARDIOLOGY | Facility: CLINIC | Age: 53
End: 2023-08-16

## 2023-08-16 NOTE — TELEPHONE ENCOUNTER
Attempted to contact patient to inquire about overdue blood work that had not been completed but was unable to get a hold of them. Left patient a voicemail for callback.

## 2023-09-27 ENCOUNTER — TELEPHONE (OUTPATIENT)
Dept: CARDIOLOGY | Facility: CLINIC | Age: 53
End: 2023-09-27

## 2023-09-27 NOTE — TELEPHONE ENCOUNTER
Attempted to contact patient to inquire about overdue blood work and to see if they had it completed at an outside [facility].

## 2023-11-20 ENCOUNTER — OFFICE VISIT (OUTPATIENT)
Dept: CARDIOLOGY | Facility: CLINIC | Age: 53
End: 2023-11-20
Payer: COMMERCIAL

## 2023-11-20 VITALS
HEIGHT: 68 IN | DIASTOLIC BLOOD PRESSURE: 78 MMHG | BODY MASS INDEX: 39.56 KG/M2 | HEART RATE: 88 BPM | SYSTOLIC BLOOD PRESSURE: 120 MMHG | WEIGHT: 261 LBS

## 2023-11-20 DIAGNOSIS — E78.00 HYPERCHOLESTEREMIA: ICD-10-CM

## 2023-11-20 DIAGNOSIS — I10 ESSENTIAL HYPERTENSION: ICD-10-CM

## 2023-11-20 DIAGNOSIS — R06.02 SHORTNESS OF BREATH: ICD-10-CM

## 2023-11-20 DIAGNOSIS — R53.82 CHRONIC FATIGUE: ICD-10-CM

## 2023-11-20 DIAGNOSIS — I25.10 CORONARY ARTERY CALCIFICATION SEEN ON CAT SCAN: Primary | ICD-10-CM

## 2023-11-20 PROCEDURE — 3074F SYST BP LT 130 MM HG: CPT | Performed by: NURSE PRACTITIONER

## 2023-11-20 PROCEDURE — 1159F MED LIST DOCD IN RCRD: CPT | Performed by: NURSE PRACTITIONER

## 2023-11-20 PROCEDURE — 99213 OFFICE O/P EST LOW 20 MIN: CPT | Performed by: NURSE PRACTITIONER

## 2023-11-20 PROCEDURE — 1160F RVW MEDS BY RX/DR IN RCRD: CPT | Performed by: NURSE PRACTITIONER

## 2023-11-20 PROCEDURE — 3078F DIAST BP <80 MM HG: CPT | Performed by: NURSE PRACTITIONER

## 2023-11-20 RX ORDER — CLONIDINE HYDROCHLORIDE 0.1 MG/1
0.1 TABLET ORAL DAILY PRN
COMMUNITY

## 2023-11-20 NOTE — PROGRESS NOTES
Chief Complaint   Patient presents with    Follow-up     Cardiac management    Lab     Last labs a month ago per PCP.    Fatigue     Feels tired and short of breath. Had iron infusion on 11/14/23.    Blood pressure     Has had a few episodes of elevated BP, she has had to take clonidine.    Med Refill     Does not need refills at this time.    Sleep apnea     Wears CPAP 3-4 times weekly.     Wilfredo Espinal is a 52 y.o. female with HTN referred for cardiac evaluation of chest discomfort, SOB and fatigue in May 2021.  Prior to consult, CT chest and abdomen showed very large hiatal hernia and coronary artery calcification as well as renovascular calcification. Lexiscan showed anteroseptal wall ischemia versus breast attenuation. Imdur 30 mg daily with plan for cath if symptoms persisted. Crestor 10 mg started for , later changed to Lipitor.  She follows with Dr. Bhandari for iron deficiency anemia, receives iron infusions frequently.     She returns today for follow-up.  She continues to deny chest pain after addition of Imdur.  She is maintained smoking cessation for 1 year.  She had most recent iron infusion November for 18th.  She occasionally has elevated blood pressure resolved with clonidine as needed.  Lipids followed by PCP.  She reports improvement with Lipitor.         Cardiac History:    Past Surgical History:   Procedure Laterality Date    CARDIOVASCULAR STRESS TEST  06/16/2021    Lexiscan- EF 45%. R/O Anteroseptal Ischemia..    ECHO - CONVERTED  06/14/2021    TLS. EF 65%. Mild MR.LA- 4.4 Cm RVSP- 15 mmHg    OTHER SURGICAL HISTORY  03/10/2021    CT Chest & Abdomen- Coronary Calcification, Renal vascular Calcification.     Current Outpatient Medications   Medication Sig Dispense Refill    aspirin 81 MG EC tablet Take 1 tablet by mouth Daily. 90 tablet 3    atorvastatin (LIPITOR) 20 MG tablet Take 1 tablet by mouth Daily. 90 tablet 3    buPROPion XL (WELLBUTRIN XL) 150 MG 24 hr tablet  Take 1 tablet by mouth Daily.      calcium carbonate (TUMS) 500 MG chewable tablet Chew 1 tablet 3 (Three) Times a Day As Needed for Indigestion or Heartburn.      cloNIDine (CATAPRES) 0.1 MG tablet Take 1 tablet by mouth Daily As Needed for High Blood Pressure.      isosorbide mononitrate (IMDUR) 30 MG 24 hr tablet Take 1 tablet by mouth Daily. 90 tablet 3    lisinopril-hydrochlorothiazide (PRINZIDE,ZESTORETIC) 20-12.5 MG per tablet Take 1 tablet by mouth Daily. 90 tablet 3    omeprazole (priLOSEC) 20 MG capsule Take 1 capsule by mouth Daily. 90 capsule 3     No current facility-administered medications for this visit.     Patient has no known allergies.    Past Medical History:   Diagnosis Date    Anemia     CAD (coronary artery disease)     GERD (gastroesophageal reflux disease)     Hiatal hernia     HS (hereditary spherocytosis)     Hyperlipidemia     Hypertension     Renal cyst     Sleep apnea     Vitamin D deficiency      Social History     Socioeconomic History    Marital status: Legally    Tobacco Use    Smoking status: Former     Packs/day: 1.00     Years: 35.00     Additional pack years: 0.00     Total pack years: 35.00     Types: Cigarettes     Quit date: 2022     Years since quittin.0    Smokeless tobacco: Never   Vaping Use    Vaping Use: Former    Quit date: 2021    Substances: Nicotine    Devices: Refillable tank   Substance and Sexual Activity    Alcohol use: Not Currently     Comment: Occasional drink 1 time yearly    Drug use: Never    Sexual activity: Defer     Family History   Problem Relation Age of Onset    Breast cancer Mother     Heart disease Father     Hypertension Father     Heart attack Father     Heart disease Brother         valve replacement    Hypertension Brother     Cancer Maternal Grandmother     No Known Problems Maternal Grandfather     Diabetes Paternal Grandmother     Heart attack Paternal Grandmother     No Known Problems Paternal Grandfather      Review  "of Systems   Constitutional: Positive for malaise/fatigue and weight loss (-1). Negative for decreased appetite.   HENT: Negative.     Eyes:  Negative for blurred vision.   Cardiovascular:  Positive for dyspnea on exertion. Negative for chest pain, leg swelling, palpitations and syncope.   Respiratory:  Positive for sleep disturbances due to breathing (Wears CPAP most of the time). Negative for shortness of breath.    Endocrine: Negative.    Hematologic/Lymphatic: Negative for bleeding problem. Does not bruise/bleed easily.   Skin: Negative.    Musculoskeletal:  Negative for falls and myalgias.   Gastrointestinal:  Negative for abdominal pain, heartburn and melena.   Genitourinary:  Negative for hematuria.   Neurological:  Negative for dizziness and light-headedness.   Psychiatric/Behavioral:  Negative for altered mental status.    Allergic/Immunologic: Negative.       Objective     /78 (BP Location: Right arm)   Pulse 88   Ht 172.7 cm (67.99\")   Wt 118 kg (261 lb)   BMI 39.69 kg/m²     Vitals and nursing note reviewed.   Constitutional:       General: Not in acute distress.     Appearance: Well-developed. Not diaphoretic.   Eyes:      Pupils: Pupils are equal, round, and reactive to light.   HENT:      Head: Normocephalic.   Pulmonary:      Effort: Pulmonary effort is normal. No respiratory distress.      Breath sounds: Normal breath sounds.   Cardiovascular:      Normal rate. Regular rhythm.   Pulses:     Intact distal pulses.   Edema:     Peripheral edema absent.   Abdominal:      General: Bowel sounds are normal.      Palpations: Abdomen is soft.   Musculoskeletal: Normal range of motion.      Cervical back: Normal range of motion. Skin:     General: Skin is warm and dry.   Neurological:      Mental Status: Alert and oriented to person, place, and time.        Procedures          Problem List Items Addressed This Visit          Cardiac and Vasculature    Coronary artery calcification seen on CAT scan - " Primary    Hypercholesteremia    Essential hypertension    Relevant Medications    cloNIDine (CATAPRES) 0.1 MG tablet       Pulmonary and Pneumonias    Shortness of breath       Symptoms and Signs    Chronic fatigue      Abnormal stress test  -questionable anteroseptal ischemia versus breast attenuation  -No anginal chest pain  -Continue Imdur 30 mg  -proceed with cardiac cath should she develop new or worsening symptoms, she agrees to report any changes in symptoms     HTN  -well-controlled with lisinopril/HCTZ  -Occasional elevated readings improved with clonidine as needed    Dyslipidemia  -Continue atorvastatin  -Labs per PCP    Tobacco use  -Remains smoke-free     FLORENTINO  -Followed by Dr. Bhandari   -Iron infusion 11/14/2023     No changes made.  Refills not needed.  Follow-up in 6 months or sooner if needed.              Electronically signed by SUNITA Menchaca,  November 22, 2023 10:06 EST

## 2023-11-22 PROBLEM — Z72.0 TOBACCO USE: Status: RESOLVED | Noted: 2021-05-17 | Resolved: 2023-11-22

## 2024-05-28 ENCOUNTER — OFFICE VISIT (OUTPATIENT)
Dept: CARDIOLOGY | Facility: CLINIC | Age: 54
End: 2024-05-28
Payer: COMMERCIAL

## 2024-05-28 VITALS
HEART RATE: 72 BPM | WEIGHT: 240.8 LBS | BODY MASS INDEX: 36.49 KG/M2 | HEIGHT: 68 IN | DIASTOLIC BLOOD PRESSURE: 60 MMHG | SYSTOLIC BLOOD PRESSURE: 110 MMHG

## 2024-05-28 DIAGNOSIS — I25.10 CORONARY ARTERY CALCIFICATION SEEN ON CAT SCAN: Primary | ICD-10-CM

## 2024-05-28 DIAGNOSIS — I10 ESSENTIAL HYPERTENSION: ICD-10-CM

## 2024-05-28 DIAGNOSIS — R07.89 CHEST HEAVINESS: ICD-10-CM

## 2024-05-28 DIAGNOSIS — D50.8 IRON DEFICIENCY ANEMIA SECONDARY TO INADEQUATE DIETARY IRON INTAKE: ICD-10-CM

## 2024-05-28 DIAGNOSIS — R06.02 SHORTNESS OF BREATH: ICD-10-CM

## 2024-05-28 DIAGNOSIS — E78.00 HYPERCHOLESTEREMIA: ICD-10-CM

## 2024-05-28 PROCEDURE — 99214 OFFICE O/P EST MOD 30 MIN: CPT | Performed by: NURSE PRACTITIONER

## 2024-05-28 PROCEDURE — 3074F SYST BP LT 130 MM HG: CPT | Performed by: NURSE PRACTITIONER

## 2024-05-28 PROCEDURE — 1160F RVW MEDS BY RX/DR IN RCRD: CPT | Performed by: NURSE PRACTITIONER

## 2024-05-28 PROCEDURE — 3078F DIAST BP <80 MM HG: CPT | Performed by: NURSE PRACTITIONER

## 2024-05-28 PROCEDURE — 1159F MED LIST DOCD IN RCRD: CPT | Performed by: NURSE PRACTITIONER

## 2024-05-28 RX ORDER — ISOSORBIDE MONONITRATE 30 MG/1
30 TABLET, EXTENDED RELEASE ORAL DAILY
Qty: 90 TABLET | Refills: 3 | Status: SHIPPED | OUTPATIENT
Start: 2024-05-28

## 2024-05-28 NOTE — PROGRESS NOTES
Chief Complaint   Patient presents with    Follow-up     Cardiac management    Lab     Last labs a month ago per PCP. She is now seeing Rajan Garcia for nephrology, to have CT of kidney next month. She is also seeing PCP tomorrow for possible sleep study.    Weight loss     Has been going to gym and walking on treadmill. Has also been watching calorie intake and healthy choices.    Med Refill     Needs refills on Isosorbide-90 day.       Wilfredo Espinal is a 53 y.o. female with HTN referred for cardiac evaluation of chest discomfort, SOB and fatigue in May 2021.  Prior to consult, CT chest and abdomen showed very large hiatal hernia and coronary artery calcification as well as renovascular calcification. Lexiscan showed anteroseptal wall ischemia versus breast attenuation. Imdur 30 mg daily with plan for cath if symptoms persisted. Crestor 10 mg started for , later changed to Lipitor.  She follows with Dr. Bhandari for iron deficiency anemia, receives iron infusions frequently.     She returns today for follow-up.  She is doing well from a cardiac standpoint. No CP, SOB, palpitations. Tolerating Imdur with good symptom control. She remains tobacco free for 1.5 yr. She has has lost 21 lbs watching diet and exercising regularly at [employer] with her daughter. Lipids managed with Lipitor, followed by PCP.        Cardiac History:    Past Surgical History:   Procedure Laterality Date    CARDIOVASCULAR STRESS TEST  06/16/2021    Lexiscan- EF 45%. R/O Anteroseptal Ischemia..    ECHO - CONVERTED  06/14/2021    TLS. EF 65%. Mild MR.LA- 4.4 Cm RVSP- 15 mmHg    OTHER SURGICAL HISTORY  03/10/2021    CT Chest & Abdomen- Coronary Calcification, Renal vascular Calcification.     Current Outpatient Medications   Medication Sig Dispense Refill    aspirin 81 MG EC tablet Take 1 tablet by mouth Daily. 90 tablet 3    atorvastatin (LIPITOR) 20 MG tablet Take 1 tablet by mouth Daily. 90 tablet 3    buPROPion XL  (WELLBUTRIN XL) 150 MG 24 hr tablet Take 1 tablet by mouth Daily.      isosorbide mononitrate (IMDUR) 30 MG 24 hr tablet Take 1 tablet by mouth Daily. 90 tablet 3    lisinopril-hydrochlorothiazide (PRINZIDE,ZESTORETIC) 20-12.5 MG per tablet Take 1 tablet by mouth Daily. 90 tablet 3    omeprazole (priLOSEC) 20 MG capsule Take 1 capsule by mouth Daily. 90 capsule 3     No current facility-administered medications for this visit.     Patient has no known allergies.    Past Medical History:   Diagnosis Date    Anemia     CAD (coronary artery disease)     GERD (gastroesophageal reflux disease)     Hiatal hernia     HS (hereditary spherocytosis)     Hyperlipidemia     Hypertension     Renal cyst     Sleep apnea     Vitamin D deficiency      Social History     Socioeconomic History    Marital status: Legally    Tobacco Use    Smoking status: Former     Current packs/day: 0.00     Average packs/day: 1 pack/day for 35.0 years (35.0 ttl pk-yrs)     Types: Cigarettes     Start date: 1987     Quit date: 2022     Years since quittin.5     Passive exposure: Past    Smokeless tobacco: Never   Vaping Use    Vaping status: Former    Quit date: 2021    Substances: Nicotine    Devices: Refillable tank   Substance and Sexual Activity    Alcohol use: Not Currently     Comment: Occasional drink 1 time yearly    Drug use: Never    Sexual activity: Defer     Family History   Problem Relation Age of Onset    Breast cancer Mother     Heart disease Father     Hypertension Father     Heart attack Father     Heart disease Brother         valve replacement    Hypertension Brother     Cancer Maternal Grandmother     No Known Problems Maternal Grandfather     Diabetes Paternal Grandmother     Heart attack Paternal Grandmother     No Known Problems Paternal Grandfather      Review of Systems   Constitutional: Positive for weight loss (-21). Negative for decreased appetite and malaise/fatigue.   HENT: Negative.     Eyes:   "Negative for blurred vision.   Cardiovascular:  Negative for chest pain, dyspnea on exertion, leg swelling, palpitations and syncope.   Respiratory:  Negative for shortness of breath and sleep disturbances due to breathing.    Endocrine: Negative.    Hematologic/Lymphatic: Negative for bleeding problem. Does not bruise/bleed easily.   Skin: Negative.    Musculoskeletal:  Negative for falls and myalgias.   Gastrointestinal:  Negative for abdominal pain, heartburn and melena.   Genitourinary:  Negative for hematuria.   Neurological:  Negative for dizziness and light-headedness.   Psychiatric/Behavioral:  Negative for altered mental status.    Allergic/Immunologic: Negative.       Objective     /60 (BP Location: Left arm)   Pulse 72   Ht 172.7 cm (67.99\")   Wt 109 kg (240 lb 12.8 oz)   BMI 36.62 kg/m²     Vitals and nursing note reviewed.   Constitutional:       General: Not in acute distress.     Appearance: Well-developed. Not diaphoretic.   Eyes:      Pupils: Pupils are equal, round, and reactive to light.   HENT:      Head: Normocephalic.   Pulmonary:      Effort: Pulmonary effort is normal. No respiratory distress.      Breath sounds: Normal breath sounds.   Cardiovascular:      Normal rate. Regular rhythm.   Pulses:     Intact distal pulses.   Edema:     Peripheral edema absent.   Abdominal:      General: Bowel sounds are normal.      Palpations: Abdomen is soft.   Musculoskeletal: Normal range of motion.      Cervical back: Normal range of motion. Skin:     General: Skin is warm and dry.   Neurological:      Mental Status: Alert and oriented to person, place, and time.        Procedures          Problem List Items Addressed This Visit          Cardiac and Vasculature    Coronary artery calcification seen on CAT scan - Primary    Relevant Medications    isosorbide mononitrate (IMDUR) 30 MG 24 hr tablet    Hypercholesteremia    Essential hypertension    Chest heaviness       Hematology and Neoplasia    " Iron deficiency anemia secondary to inadequate dietary iron intake       Pulmonary and Pneumonias    Shortness of breath      Abnormal stress test  -questionable anteroseptal ischemia versus breast attenuation  -No anginal chest pain  -Continue Imdur 30 mg  -proceed with cardiac cath should she develop new or worsening symptoms, she agrees to report any changes in symptoms     HTN  -well-controlled with lisinopril/HCTZ  -has not required clonidine      Dyslipidemia  -Continue atorvastatin  -Labs per PCP     Tobacco use  -Remains smoke-free for 1.5 years      FLORENTINO  -Followed by Dr. Bhandari      No changes made.  Refills sent for Imdur.  Follow-up in 6 months or sooner if needed.    Class 2 Severe Obesity (BMI >=35 and <=39.9). Obesity-related health conditions include the following: obstructive sleep apnea, hypertension, coronary heart disease, diabetes mellitus, and dyslipidemias. Obesity is improving with lifestyle modifications. BMI is is above average; BMI management plan is completed. We discussed portion control and increasing exercise. She was congratulated on her healthy habits and exercise!              Electronically signed by SUNITA Menchaca,  May 29, 2024 11:13 EDT

## 2024-05-29 PROBLEM — D50.8 IRON DEFICIENCY ANEMIA SECONDARY TO INADEQUATE DIETARY IRON INTAKE: Status: ACTIVE | Noted: 2024-05-29

## 2024-12-02 ENCOUNTER — OFFICE VISIT (OUTPATIENT)
Dept: CARDIOLOGY | Facility: CLINIC | Age: 54
End: 2024-12-02
Payer: COMMERCIAL

## 2024-12-02 VITALS
HEIGHT: 68 IN | HEART RATE: 78 BPM | SYSTOLIC BLOOD PRESSURE: 126 MMHG | WEIGHT: 238 LBS | DIASTOLIC BLOOD PRESSURE: 74 MMHG | BODY MASS INDEX: 36.07 KG/M2

## 2024-12-02 DIAGNOSIS — E78.00 HYPERCHOLESTEREMIA: ICD-10-CM

## 2024-12-02 DIAGNOSIS — R94.39 ABNORMAL STRESS TEST: ICD-10-CM

## 2024-12-02 DIAGNOSIS — I10 ESSENTIAL HYPERTENSION: Primary | ICD-10-CM

## 2024-12-02 DIAGNOSIS — D50.8 IRON DEFICIENCY ANEMIA SECONDARY TO INADEQUATE DIETARY IRON INTAKE: ICD-10-CM

## 2024-12-02 PROCEDURE — 3074F SYST BP LT 130 MM HG: CPT | Performed by: NURSE PRACTITIONER

## 2024-12-02 PROCEDURE — 99214 OFFICE O/P EST MOD 30 MIN: CPT | Performed by: NURSE PRACTITIONER

## 2024-12-02 PROCEDURE — 3078F DIAST BP <80 MM HG: CPT | Performed by: NURSE PRACTITIONER

## 2024-12-02 RX ORDER — ISOSORBIDE MONONITRATE 30 MG/1
30 TABLET, EXTENDED RELEASE ORAL DAILY
Qty: 90 TABLET | Refills: 3 | Status: SHIPPED | OUTPATIENT
Start: 2024-12-02

## 2024-12-02 NOTE — PROGRESS NOTES
Chief Complaint   Patient presents with    Follow-up     Cardiac management    LABS     Had labs per Dr Krishnan. She has been receiving iron therapy at Dr krishnan office . No current lipid panel    Med Refill     Requests refills for Imdur and omeprazole  90 day supply to [facility]        HPI:  ARIAS Espinal is a 53 y.o. female with HTN referred for cardiac evaluation of chest discomfort, SOB and fatigue in May 2021.  Prior to consult, CT chest and abdomen showed very large hiatal hernia and coronary artery calcification as well as renovascular calcification. Lexiscan showed anteroseptal wall ischemia versus breast attenuation. Imdur 30 mg daily with plan for cath if symptoms persisted. Crestor 10 mg started for , later changed to Lipitor.  She follows with Dr. Krishnan for iron deficiency anemia, receives iron infusions frequently.     She returns today for follow-up.  She remains tobacco free for 2 yrs. She recently recovered from PNA. Patient denies chest pain, pressure, palpitations, tightness, dizziness, shortness of air. Labs recently with Dr. Parker.     Cardiac History:    Past Surgical History:   Procedure Laterality Date    CARDIOVASCULAR STRESS TEST  06/16/2021    Lexiscan- EF 45%. R/O Anteroseptal Ischemia..    ECHO - CONVERTED  06/14/2021    TLS. EF 65%. Mild MR.LA- 4.4 Cm RVSP- 15 mmHg    OTHER SURGICAL HISTORY  03/10/2021    CT Chest & Abdomen- Coronary Calcification, Renal vascular Calcification.       Current Outpatient Medications   Medication Sig Dispense Refill    aspirin 81 MG EC tablet Take 1 tablet by mouth Daily. 90 tablet 3    atorvastatin (LIPITOR) 20 MG tablet Take 1 tablet by mouth Daily. 90 tablet 3    buPROPion XL (WELLBUTRIN XL) 150 MG 24 hr tablet Take 1 tablet by mouth Daily.      isosorbide mononitrate (IMDUR) 30 MG 24 hr tablet Take 1 tablet by mouth Daily. 90 tablet 3    lisinopril-hydrochlorothiazide (PRINZIDE,ZESTORETIC) 20-12.5 MG per tablet Take 1 tablet by  "mouth Daily. 90 tablet 3    omeprazole (priLOSEC) 20 MG capsule Take 1 capsule by mouth Daily. 90 capsule 3     No current facility-administered medications for this visit.       Patient has no known allergies.    Past Medical History:   Diagnosis Date    Anemia     CAD (coronary artery disease)     GERD (gastroesophageal reflux disease)     Hiatal hernia     HS (hereditary spherocytosis)     Hyperlipidemia     Hypertension     Renal cyst     Sleep apnea     Vitamin D deficiency        Social History     Socioeconomic History    Marital status: Legally    Tobacco Use    Smoking status: Former     Current packs/day: 0.00     Average packs/day: 1 pack/day for 35.0 years (35.0 ttl pk-yrs)     Types: Cigarettes     Start date: 1987     Quit date: 2022     Years since quittin.0     Passive exposure: Past    Smokeless tobacco: Never   Vaping Use    Vaping status: Former    Quit date: 2021    Substances: Nicotine    Devices: Refillable tank   Substance and Sexual Activity    Alcohol use: Not Currently     Comment: Occasional drink 1 time yearly    Drug use: Never    Sexual activity: Defer       Family History   Problem Relation Age of Onset    Breast cancer Mother     Heart disease Father     Hypertension Father     Heart attack Father     Heart disease Brother         valve replacement    Hypertension Brother     Cancer Maternal Grandmother     No Known Problems Maternal Grandfather     Diabetes Paternal Grandmother     Heart attack Paternal Grandmother     No Known Problems Paternal Grandfather        Vitals:   /74 (BP Location: Left arm, Patient Position: Sitting, Cuff Size: Adult)   Pulse 78   Ht 172.7 cm (67.99\")   Wt 108 kg (238 lb)   BMI 36.20 kg/m²     Physical Exam  Vitals and nursing note reviewed.   Constitutional:       Appearance: She is morbidly obese.   Neck:      Vascular: No carotid bruit.   Cardiovascular:      Rate and Rhythm: Normal rate and regular rhythm.      " Pulses: Normal pulses.      Heart sounds: Normal heart sounds. No murmur heard.     No friction rub. No gallop.   Pulmonary:      Effort: Pulmonary effort is normal.      Breath sounds: Normal breath sounds and air entry.   Musculoskeletal:      Right lower leg: No edema.      Left lower leg: No edema.   Skin:     General: Skin is warm and dry.      Capillary Refill: Capillary refill takes less than 2 seconds.   Neurological:      Mental Status: She is alert and oriented to person, place, and time.       Procedures     Assessment & Plan     Diagnoses and all orders for this visit:    1. Essential hypertension (Primary)    2. Hypercholesteremia    3. Iron deficiency anemia secondary to inadequate dietary iron intake    4. Abnormal stress test    Other orders  -     isosorbide mononitrate (IMDUR) 30 MG 24 hr tablet; Take 1 tablet by mouth Daily.  Dispense: 90 tablet; Refill: 3  -     omeprazole (priLOSEC) 20 MG capsule; Take 1 capsule by mouth Daily.  Dispense: 90 capsule; Refill: 3    Hypertension  - BP controlled  - Continue current antihypertensive medication regimen    Hypercholesteremia  - Labs managed with PCP  - Continue atorvastatin 20 mg    Iron deficiency anemia  - Following with Dr. Bhandari for iron infusions, continue    Abnormal stress test previously  - Currently asymptomatic, no anginal pain  - Continue isosorbide  - Consider heart cath should she develop new or worsening symptoms    Stable from a cardiac standpoint. No further testing recommended at this time. No medication changes made today. Refills sent to pharmacy for isosorbide and omeprazole.        Visit Diagnoses:    ICD-10-CM ICD-9-CM   1. Essential hypertension  I10 401.9   2. Hypercholesteremia  E78.00 272.0   3. Iron deficiency anemia secondary to inadequate dietary iron intake  D50.8 280.1   4. Abnormal stress test  R94.39 794.39       Meds Ordered During Visit:  New Medications Ordered This Visit   Medications    isosorbide mononitrate  (IMDUR) 30 MG 24 hr tablet     Sig: Take 1 tablet by mouth Daily.     Dispense:  90 tablet     Refill:  3    omeprazole (priLOSEC) 20 MG capsule     Sig: Take 1 capsule by mouth Daily.     Dispense:  90 capsule     Refill:  3       Follow Up Appointment:   Return in about 6 months (around 6/2/2025), or if symptoms worsen or fail to improve.           This document has been electronically signed by SUNITA Almaraz  December 2, 2024 11:21 EST    Dictated Utilizing Dragon Dictation: Part of this note may be an electronic transcription/translation of spoken language to printed text using the Dragon Dictation System.

## 2025-04-14 RX ORDER — ISOSORBIDE MONONITRATE 30 MG/1
30 TABLET, EXTENDED RELEASE ORAL DAILY
Qty: 90 TABLET | Refills: 0 | OUTPATIENT
Start: 2025-04-14

## 2025-05-21 RX ORDER — ATORVASTATIN CALCIUM 20 MG/1
20 TABLET, FILM COATED ORAL DAILY
Qty: 90 TABLET | Refills: 0 | Status: SHIPPED | OUTPATIENT
Start: 2025-05-21

## 2025-05-21 NOTE — TELEPHONE ENCOUNTER
Caller: Norma Espinalmilady    Relationship: Self    Best call back number: [phone]     Requested Prescriptions:   Requested Prescriptions     Pending Prescriptions Disp Refills    atorvastatin (LIPITOR) 20 MG tablet 90 tablet 3     Sig: Take 1 tablet by mouth Daily.        Pharmacy where request should be sent:      [facility] [place], [place] - [address] - [phone] PH - [phone] FX [[id number]]     Last office visit with prescribing clinician: 12/2/2024   Last telemedicine visit with prescribing clinician: Visit date not found   Next office visit with prescribing clinician: 6/11/2025     Additional details provided by patient: NA    Does the patient have less than a 3 day supply:  [x] Yes  [] No    Would you like a call back once the refill request has been completed: [] Yes [x] No    If the office needs to give you a call back, can they leave a voicemail: [x] Yes [] No    Azeb Mcintyre Rep   05/21/25 12:00 EDT

## 2025-06-11 ENCOUNTER — OFFICE VISIT (OUTPATIENT)
Dept: CARDIOLOGY | Facility: CLINIC | Age: 55
End: 2025-06-11
Payer: COMMERCIAL

## 2025-06-11 VITALS
HEIGHT: 68 IN | DIASTOLIC BLOOD PRESSURE: 80 MMHG | HEART RATE: 76 BPM | SYSTOLIC BLOOD PRESSURE: 130 MMHG | WEIGHT: 264 LBS | BODY MASS INDEX: 40.01 KG/M2

## 2025-06-11 DIAGNOSIS — E78.00 HYPERCHOLESTEREMIA: ICD-10-CM

## 2025-06-11 DIAGNOSIS — I10 ESSENTIAL HYPERTENSION: Primary | ICD-10-CM

## 2025-06-11 DIAGNOSIS — D50.8 IRON DEFICIENCY ANEMIA SECONDARY TO INADEQUATE DIETARY IRON INTAKE: ICD-10-CM

## 2025-06-11 DIAGNOSIS — I25.10 CORONARY ARTERY CALCIFICATION SEEN ON CAT SCAN: ICD-10-CM

## 2025-06-11 PROCEDURE — 3079F DIAST BP 80-89 MM HG: CPT | Performed by: NURSE PRACTITIONER

## 2025-06-11 PROCEDURE — 99214 OFFICE O/P EST MOD 30 MIN: CPT | Performed by: NURSE PRACTITIONER

## 2025-06-11 PROCEDURE — 3075F SYST BP GE 130 - 139MM HG: CPT | Performed by: NURSE PRACTITIONER

## 2025-06-11 RX ORDER — LISINOPRIL AND HYDROCHLOROTHIAZIDE 12.5; 2 MG/1; MG/1
1 TABLET ORAL DAILY
Qty: 90 TABLET | Refills: 3 | Status: SHIPPED | OUTPATIENT
Start: 2025-06-11

## 2025-06-11 RX ORDER — ISOSORBIDE MONONITRATE 30 MG/1
30 TABLET, EXTENDED RELEASE ORAL DAILY
Qty: 90 TABLET | Refills: 3 | Status: SHIPPED | OUTPATIENT
Start: 2025-06-11

## 2025-06-11 RX ORDER — OMEPRAZOLE 20 MG/1
20 CAPSULE, DELAYED RELEASE ORAL DAILY
Qty: 90 CAPSULE | Refills: 3 | Status: SHIPPED | OUTPATIENT
Start: 2025-06-11

## 2025-06-11 RX ORDER — ATORVASTATIN CALCIUM 20 MG/1
20 TABLET, FILM COATED ORAL DAILY
Qty: 90 TABLET | Refills: 3 | Status: SHIPPED | OUTPATIENT
Start: 2025-06-11

## 2025-06-11 RX ORDER — ASPIRIN 81 MG/1
81 TABLET ORAL DAILY
Qty: 90 TABLET | Refills: 3 | Status: SHIPPED | OUTPATIENT
Start: 2025-06-11

## 2025-06-11 NOTE — PROGRESS NOTES
Chief Complaint   Patient presents with    Follow-up     For cardiac management. Patient is on aspirin. Last lab work was done about 3 months ago per PCP, not in chart.     Med Refill     Needs refills on cardiac medications. 90 day supplies to [facility] in [place]. Brought medication list with visit.         HPI:  ARIAS Espinal is a 53 y.o. female with HTN referred for cardiac evaluation of chest discomfort, SOB and fatigue in May 2021.  Prior to consult, CT chest and abdomen showed very large hiatal hernia and coronary artery calcification as well as renovascular calcification. Lexiscan showed anteroseptal wall ischemia versus breast attenuation. Imdur 30 mg daily with plan for cath if symptoms persisted. She follows with hematology/oncology for iron deficiency anemia, receives iron infusions frequently.     She returns today for follow-up.  She remains tobacco free for 2 yrs.  Labs with PCP about 3 months ago and reported as normal. She continues to follow with heme/onc for anemia and iron infusions. Patient denies chest pain, pressure, palpitations, tightness, dizziness, shortness of air.     Cardiac History:    Past Surgical History:   Procedure Laterality Date    CARDIOVASCULAR STRESS TEST  06/16/2021    Lexiscan- EF 45%. R/O Anteroseptal Ischemia..    ECHO - CONVERTED  06/14/2021    TLS. EF 65%. Mild MR.LA- 4.4 Cm RVSP- 15 mmHg    OTHER SURGICAL HISTORY  03/10/2021    CT Chest & Abdomen- Coronary Calcification, Renal vascular Calcification.       Current Outpatient Medications   Medication Sig Dispense Refill    aspirin 81 MG EC tablet Take 1 tablet by mouth Daily. 90 tablet 3    atorvastatin (LIPITOR) 20 MG tablet Take 1 tablet by mouth Daily. 90 tablet 3    buPROPion XL (WELLBUTRIN XL) 150 MG 24 hr tablet Take 1 tablet by mouth Daily.      isosorbide mononitrate (IMDUR) 30 MG 24 hr tablet Take 1 tablet by mouth Daily. 90 tablet 3    lisinopril-hydrochlorothiazide (PRINZIDE,ZESTORETIC) 20-12.5 MG  "per tablet Take 1 tablet by mouth Daily. 90 tablet 3    omeprazole (priLOSEC) 20 MG capsule Take 1 capsule by mouth Daily. 90 capsule 3     No current facility-administered medications for this visit.       Patient has no known allergies.    Past Medical History:   Diagnosis Date    Anemia     CAD (coronary artery disease)     GERD (gastroesophageal reflux disease)     Hiatal hernia     HS (hereditary spherocytosis)     Hyperlipidemia     Hypertension     Renal cyst     Sleep apnea     Vitamin D deficiency        Social History     Socioeconomic History    Marital status: Legally    Tobacco Use    Smoking status: Former     Current packs/day: 0.00     Average packs/day: 1 pack/day for 35.0 years (35.0 ttl pk-yrs)     Types: Cigarettes     Start date: 1987     Quit date: 2022     Years since quittin.6     Passive exposure: Past    Smokeless tobacco: Never   Vaping Use    Vaping status: Former    Quit date: 2021    Substances: Nicotine    Devices: Refillable tank   Substance and Sexual Activity    Alcohol use: Not Currently     Comment: Occasional drink 1 time yearly    Drug use: Never    Sexual activity: Defer       Family History   Problem Relation Age of Onset    Breast cancer Mother     Heart disease Father     Hypertension Father     Heart attack Father     Heart disease Brother         valve replacement    Hypertension Brother     Cancer Maternal Grandmother     No Known Problems Maternal Grandfather     Diabetes Paternal Grandmother     Heart attack Paternal Grandmother     No Known Problems Paternal Grandfather        Vitals:   /80 (BP Location: Right arm)   Pulse 76   Ht 172.7 cm (67.99\")   Wt 120 kg (264 lb)   BMI 40.15 kg/m²     Physical Exam  Vitals and nursing note reviewed.   Constitutional:       Appearance: She is morbidly obese.   Neck:      Vascular: No carotid bruit.   Cardiovascular:      Rate and Rhythm: Normal rate and regular rhythm.      Pulses: Normal " pulses.      Heart sounds: Normal heart sounds. No murmur heard.     No friction rub. No gallop.   Pulmonary:      Effort: Pulmonary effort is normal.      Breath sounds: Normal breath sounds and air entry.   Musculoskeletal:      Right lower leg: No edema.      Left lower leg: No edema.   Skin:     General: Skin is warm and dry.      Capillary Refill: Capillary refill takes less than 2 seconds.   Neurological:      Mental Status: She is alert and oriented to person, place, and time.       Procedures     Assessment & Plan     Diagnoses and all orders for this visit:    1. Essential hypertension (Primary)  -     lisinopril-hydrochlorothiazide (PRINZIDE,ZESTORETIC) 20-12.5 MG per tablet; Take 1 tablet by mouth Daily.  Dispense: 90 tablet; Refill: 3    2. Hypercholesteremia  -     atorvastatin (LIPITOR) 20 MG tablet; Take 1 tablet by mouth Daily.  Dispense: 90 tablet; Refill: 3    3. Coronary artery calcification seen on CAT scan  -     aspirin 81 MG EC tablet; Take 1 tablet by mouth Daily.  Dispense: 90 tablet; Refill: 3    4. Iron deficiency anemia secondary to inadequate dietary iron intake    Other orders  -     isosorbide mononitrate (IMDUR) 30 MG 24 hr tablet; Take 1 tablet by mouth Daily.  Dispense: 90 tablet; Refill: 3  -     omeprazole (priLOSEC) 20 MG capsule; Take 1 capsule by mouth Daily.  Dispense: 90 capsule; Refill: 3    HTN  - BP controlled  - Continue Imdur, lisinopril-HCTZ    Hypercholesteremia  - Labs with PCP and not available for my review today  - Continue atorvastatin    Iron deficiency anemia  - She follows with hematology/oncology for regular iron infusions and monitoring    Stable from a cardiac standpoint. No further testing recommended at this time. No medication changes made today. Refills sent to pharmacy     Visit Diagnoses:    ICD-10-CM ICD-9-CM   1. Essential hypertension  I10 401.9   2. Hypercholesteremia  E78.00 272.0   3. Coronary artery calcification seen on CAT scan  I25.10 414.00    4. Iron deficiency anemia secondary to inadequate dietary iron intake  D50.8 280.1       Meds Ordered During Visit:  New Medications Ordered This Visit   Medications    aspirin 81 MG EC tablet     Sig: Take 1 tablet by mouth Daily.     Dispense:  90 tablet     Refill:  3    atorvastatin (LIPITOR) 20 MG tablet     Sig: Take 1 tablet by mouth Daily.     Dispense:  90 tablet     Refill:  3    isosorbide mononitrate (IMDUR) 30 MG 24 hr tablet     Sig: Take 1 tablet by mouth Daily.     Dispense:  90 tablet     Refill:  3    lisinopril-hydrochlorothiazide (PRINZIDE,ZESTORETIC) 20-12.5 MG per tablet     Sig: Take 1 tablet by mouth Daily.     Dispense:  90 tablet     Refill:  3    omeprazole (priLOSEC) 20 MG capsule     Sig: Take 1 capsule by mouth Daily.     Dispense:  90 capsule     Refill:  3       Follow Up Appointment:   Return in about 6 months (around 12/11/2025), or if symptoms worsen or fail to improve.           This document has been electronically signed by SUNITA Almaraz  June 11, 2025 13:34 EDT    Dictated Utilizing Dragon Dictation: Part of this note may be an electronic transcription/translation of spoken language to printed text using the Dragon Dictation System.